# Patient Record
Sex: MALE | Race: WHITE | Employment: OTHER | URBAN - METROPOLITAN AREA
[De-identification: names, ages, dates, MRNs, and addresses within clinical notes are randomized per-mention and may not be internally consistent; named-entity substitution may affect disease eponyms.]

---

## 2017-01-09 ENCOUNTER — TELEPHONE (OUTPATIENT)
Dept: INTERNAL MEDICINE CLINIC | Facility: CLINIC | Age: 62
End: 2017-01-09

## 2017-01-24 ENCOUNTER — OFFICE VISIT (OUTPATIENT)
Dept: INTERNAL MEDICINE CLINIC | Facility: CLINIC | Age: 62
End: 2017-01-24

## 2017-01-24 VITALS
TEMPERATURE: 98 F | WEIGHT: 233.19 LBS | OXYGEN SATURATION: 98 % | BODY MASS INDEX: 30.25 KG/M2 | DIASTOLIC BLOOD PRESSURE: 86 MMHG | RESPIRATION RATE: 16 BRPM | SYSTOLIC BLOOD PRESSURE: 132 MMHG | HEART RATE: 71 BPM | HEIGHT: 73.5 IN

## 2017-01-24 DIAGNOSIS — I10 ESSENTIAL HYPERTENSION: ICD-10-CM

## 2017-01-24 DIAGNOSIS — E11.9 TYPE 2 DIABETES MELLITUS WITHOUT COMPLICATION, WITHOUT LONG-TERM CURRENT USE OF INSULIN (HCC): Primary | ICD-10-CM

## 2017-01-24 DIAGNOSIS — E78.00 PURE HYPERCHOLESTEROLEMIA: ICD-10-CM

## 2017-01-24 DIAGNOSIS — Z23 NEED FOR VACCINATION: ICD-10-CM

## 2017-01-24 PROCEDURE — 90471 IMMUNIZATION ADMIN: CPT | Performed by: INTERNAL MEDICINE

## 2017-01-24 PROCEDURE — 90715 TDAP VACCINE 7 YRS/> IM: CPT | Performed by: INTERNAL MEDICINE

## 2017-01-24 PROCEDURE — 99214 OFFICE O/P EST MOD 30 MIN: CPT | Performed by: INTERNAL MEDICINE

## 2017-01-24 RX ORDER — LISINOPRIL 10 MG/1
10 TABLET ORAL
Qty: 90 TABLET | Refills: 1 | Status: SHIPPED | OUTPATIENT
Start: 2017-01-24 | End: 2017-08-02

## 2017-01-24 RX ORDER — ATORVASTATIN CALCIUM 40 MG/1
TABLET, FILM COATED ORAL
Qty: 45 TABLET | Refills: 1 | Status: SHIPPED | OUTPATIENT
Start: 2017-01-24 | End: 2017-08-02

## 2017-01-24 NOTE — PROGRESS NOTES
HPI:   Rory Escamilla is a 59year old male who presents for recheck of his diabetes. he feels like his weight is the same  Wearing heavier shoes and coat, got weighted at work last week and 128  Just joined the DM lifestyle program at [address] abnormalities      Results for orders placed or performed in visit on 74/58/61  -COMP METABOLIC PANEL (14)   Result Value Ref Range   GLUCOSE 127 (H) 65 - 99 mg/dL   UREA NITROGEN (BUN) 19 7 - 25 mg/dL   CREATININE 0.92 0.70 - 1.25 mg/dL   eGFR NON-AFR.  AM insulin (hcc)  (primary encounter diagnosis)  Pure hypercholesterolemia  Essential hypertension  Need for vaccination      Orders Placed This Encounter  CMP in 3 months  Lipid in 3 months  Hemoglobin A1C in 3 months  TdaP (Boostrix/Adacel) Vaccine (> 7 Y)

## 2017-02-06 RX ORDER — BLOOD SUGAR DIAGNOSTIC
STRIP MISCELLANEOUS
Qty: 200 STRIP | Refills: 0 | Status: SHIPPED | OUTPATIENT
Start: 2017-02-06 | End: 2018-01-29

## 2017-04-03 RX ORDER — METFORMIN HYDROCHLORIDE 500 MG/1
TABLET, EXTENDED RELEASE ORAL
Qty: 90 TABLET | Refills: 0 | Status: SHIPPED | OUTPATIENT
Start: 2017-04-03 | End: 2017-04-28 | Stop reason: DRUGHIGH

## 2017-04-28 ENCOUNTER — OFFICE VISIT (OUTPATIENT)
Dept: INTERNAL MEDICINE CLINIC | Facility: CLINIC | Age: 62
End: 2017-04-28

## 2017-04-28 VITALS
RESPIRATION RATE: 16 BRPM | WEIGHT: 230 LBS | HEIGHT: 73.5 IN | DIASTOLIC BLOOD PRESSURE: 82 MMHG | TEMPERATURE: 98 F | BODY MASS INDEX: 29.83 KG/M2 | SYSTOLIC BLOOD PRESSURE: 122 MMHG | HEART RATE: 76 BPM | OXYGEN SATURATION: 98 %

## 2017-04-28 DIAGNOSIS — Z12.11 COLON CANCER SCREENING: Primary | ICD-10-CM

## 2017-04-28 DIAGNOSIS — IMO0001 UNCONTROLLED TYPE 2 DIABETES MELLITUS WITHOUT COMPLICATION, WITHOUT LONG-TERM CURRENT USE OF INSULIN: ICD-10-CM

## 2017-04-28 PROCEDURE — 99214 OFFICE O/P EST MOD 30 MIN: CPT | Performed by: INTERNAL MEDICINE

## 2017-04-28 RX ORDER — METFORMIN HYDROCHLORIDE 500 MG/1
1000 TABLET, EXTENDED RELEASE ORAL
Qty: 180 TABLET | Refills: 1 | Status: SHIPPED | OUTPATIENT
Start: 2017-04-28 | End: 2018-02-20

## 2017-04-28 NOTE — PROGRESS NOTES
HPI:   Elizabeth Hearn is a 57year old male who presents for recheck of his diabetes.  They are still going to classes at [facility] but not much in the past 2 months  He is running higher    Wt Readings from Last 6 Encounters:  04/28/17 : 230 lb  01/24/17 : appearance is normal.  Bilateral monofilament/sensation of both feet is normal.  Pulsation pedal pulse exam of both lower legs/feet is normal as well.         Results for orders placed or performed in visit on 56/89/22  -COMP METABOLIC PANEL (14)   Result V (primary encounter diagnosis)      Orders Placed This Encounter  Hemoglobin A1C in 3 months  Occult Blood, Fecal, Immunoassay [E]        Meds & Refills for this Visit:  Signed Prescriptions Disp Refills    MetFORMIN HCl  MG Oral Tablet 24 Hr 180 tabl

## 2017-08-02 RX ORDER — ATORVASTATIN CALCIUM 40 MG/1
TABLET, FILM COATED ORAL
Qty: 45 TABLET | Refills: 0 | Status: SHIPPED | OUTPATIENT
Start: 2017-08-02 | End: 2017-10-22

## 2017-08-02 RX ORDER — LISINOPRIL 10 MG/1
TABLET ORAL
Qty: 90 TABLET | Refills: 0 | Status: SHIPPED | OUTPATIENT
Start: 2017-08-02 | End: 2017-10-22

## 2017-09-14 LAB — HEMOGLOBIN A1C: 6.8 % OF TOTAL HGB

## 2017-09-18 ENCOUNTER — OFFICE VISIT (OUTPATIENT)
Dept: INTERNAL MEDICINE CLINIC | Facility: CLINIC | Age: 62
End: 2017-09-18

## 2017-09-18 VITALS
RESPIRATION RATE: 16 BRPM | HEIGHT: 73.5 IN | BODY MASS INDEX: 29.19 KG/M2 | WEIGHT: 225 LBS | SYSTOLIC BLOOD PRESSURE: 122 MMHG | OXYGEN SATURATION: 98 % | TEMPERATURE: 98 F | HEART RATE: 79 BPM | DIASTOLIC BLOOD PRESSURE: 82 MMHG

## 2017-09-18 DIAGNOSIS — E78.00 PURE HYPERCHOLESTEROLEMIA: ICD-10-CM

## 2017-09-18 DIAGNOSIS — Z00.00 ROUTINE GENERAL MEDICAL EXAMINATION AT A HEALTH CARE FACILITY: ICD-10-CM

## 2017-09-18 DIAGNOSIS — I10 ESSENTIAL HYPERTENSION: ICD-10-CM

## 2017-09-18 DIAGNOSIS — IMO0001 UNCONTROLLED TYPE 2 DIABETES MELLITUS WITHOUT COMPLICATION, WITHOUT LONG-TERM CURRENT USE OF INSULIN: Primary | ICD-10-CM

## 2017-09-18 PROCEDURE — 99214 OFFICE O/P EST MOD 30 MIN: CPT | Performed by: INTERNAL MEDICINE

## 2017-09-18 NOTE — PROGRESS NOTES
HPI:   Sergio Weiss is a 57year old male who presents for recheck of his diabetes.    He retired and is more active    Wt Readings from Last 6 Encounters:  09/18/17 : 225 lb  04/28/17 : 230 lb  01/24/17 : 233 lb 3.2 oz  07/07/16 : 225 lb  02/11/16 : 223 placed or performed in visit on 04/28/17  -HEMOGLOBIN A1C   Result Value Ref Range   HEMOGLOBIN A1c 6.8 (H) <5.7 % of total Hgb           ASSESSMENT AND PLAN:   Virginia Robertson is a 57year old male who presents for a recheck of his diabetes.  Diabetic contr

## 2017-10-24 RX ORDER — ATORVASTATIN CALCIUM 40 MG/1
TABLET, FILM COATED ORAL
Qty: 45 TABLET | Refills: 0 | Status: SHIPPED | OUTPATIENT
Start: 2017-10-24 | End: 2017-12-19

## 2017-10-24 RX ORDER — LISINOPRIL 10 MG/1
TABLET ORAL
Qty: 90 TABLET | Refills: 0 | Status: SHIPPED | OUTPATIENT
Start: 2017-10-24 | End: 2017-12-19

## 2017-12-09 ENCOUNTER — TELEPHONE (OUTPATIENT)
Dept: INTERNAL MEDICINE CLINIC | Facility: CLINIC | Age: 62
End: 2017-12-09

## 2017-12-09 NOTE — TELEPHONE ENCOUNTER
Incoming (mail or fax):  fax  Received from:  Kale Mccall  Documentation given to:  [facility]

## 2017-12-19 ENCOUNTER — OFFICE VISIT (OUTPATIENT)
Dept: INTERNAL MEDICINE CLINIC | Facility: CLINIC | Age: 62
End: 2017-12-19

## 2017-12-19 VITALS
BODY MASS INDEX: 30.33 KG/M2 | RESPIRATION RATE: 16 BRPM | TEMPERATURE: 98 F | HEART RATE: 88 BPM | HEIGHT: 72.5 IN | WEIGHT: 226.38 LBS | OXYGEN SATURATION: 98 % | SYSTOLIC BLOOD PRESSURE: 120 MMHG | DIASTOLIC BLOOD PRESSURE: 84 MMHG

## 2017-12-19 DIAGNOSIS — Z00.00 ROUTINE GENERAL MEDICAL EXAMINATION AT A HEALTH CARE FACILITY: Primary | ICD-10-CM

## 2017-12-19 DIAGNOSIS — IMO0001 UNCONTROLLED TYPE 2 DIABETES MELLITUS WITHOUT COMPLICATION, WITHOUT LONG-TERM CURRENT USE OF INSULIN: ICD-10-CM

## 2017-12-19 DIAGNOSIS — E78.00 PURE HYPERCHOLESTEROLEMIA: ICD-10-CM

## 2017-12-19 DIAGNOSIS — I10 ESSENTIAL HYPERTENSION: ICD-10-CM

## 2017-12-19 PROCEDURE — 99396 PREV VISIT EST AGE 40-64: CPT | Performed by: INTERNAL MEDICINE

## 2017-12-19 PROCEDURE — 99213 OFFICE O/P EST LOW 20 MIN: CPT | Performed by: INTERNAL MEDICINE

## 2017-12-19 RX ORDER — LISINOPRIL 10 MG/1
TABLET ORAL
Qty: 90 TABLET | Refills: 1 | Status: SHIPPED | OUTPATIENT
Start: 2017-12-19 | End: 2018-07-22

## 2017-12-19 RX ORDER — ATORVASTATIN CALCIUM 40 MG/1
TABLET, FILM COATED ORAL
Qty: 45 TABLET | Refills: 1 | Status: SHIPPED | OUTPATIENT
Start: 2017-12-19 | End: 2018-07-22

## 2017-12-19 NOTE — PROGRESS NOTES
Francisca Pritchard is a 57year old male who presents for a complete physical exam.   Also for his routine scheduled DM check  HPI:   He was doing their DM class at [facility], the instrucotr moved to [place] and has n't been back.     Wt Readings from Last 6 Encount lesions  EYES:denies blurred vision or double vision  HEENT: denies nasal congestion, sinus pain or ST  LUNGS: denies shortness of breath with exertion or chronic cough  CARDIOVASCULAR: denies chest pain on exertion  GI: denies abdominal pain,denies heartb mL/min/1.73m2 Final   • BUN/CREATININE RATIO 65/49/5692 NOT APPLICABLE  6 - 22 (calc) Final   • SODIUM 12/15/2017 136  135 - 146 mmol/L Final   • POTASSIUM 12/15/2017 4.4  3.5 - 5.3 mmol/L Final   • CHLORIDE 12/15/2017 103  98 - 110 mmol/L Final   • CARBON UTD,, on statin and ACE  Routine general medical examination at a health care facility  (primary encounter diagnosis)  Pure hypercholesterolemia- on statin, CPM  Essential hypertension at goal, CPM      Orders Placed This Encounter      CMP in 6 months

## 2018-01-29 RX ORDER — BLOOD SUGAR DIAGNOSTIC
STRIP MISCELLANEOUS
Qty: 200 STRIP | Refills: 0 | Status: SHIPPED | OUTPATIENT
Start: 2018-01-29 | End: 2018-06-20

## 2018-02-20 DIAGNOSIS — IMO0001 UNCONTROLLED TYPE 2 DIABETES MELLITUS WITHOUT COMPLICATION, WITHOUT LONG-TERM CURRENT USE OF INSULIN: ICD-10-CM

## 2018-02-21 RX ORDER — METFORMIN HYDROCHLORIDE 500 MG/1
TABLET, EXTENDED RELEASE ORAL
Qty: 180 TABLET | Refills: 1 | Status: SHIPPED | OUTPATIENT
Start: 2018-02-21 | End: 2018-12-14

## 2018-06-21 RX ORDER — BLOOD SUGAR DIAGNOSTIC
STRIP MISCELLANEOUS
Qty: 200 STRIP | Refills: 0 | Status: SHIPPED | OUTPATIENT
Start: 2018-06-21

## 2018-07-24 LAB
ALBUMIN/GLOBULIN RATIO: 1.7 (CALC) (ref 1–2.5)
ALBUMIN: 3.9 G/DL (ref 3.6–5.1)
ALKALINE PHOSPHATASE: 78 U/L (ref 40–115)
ALT: 13 U/L (ref 9–46)
AST: 12 U/L (ref 10–35)
BILIRUBIN, TOTAL: 0.5 MG/DL (ref 0.2–1.2)
BUN: 19 MG/DL (ref 7–25)
CALCIUM: 8.7 MG/DL (ref 8.6–10.3)
CARBON DIOXIDE: 26 MMOL/L (ref 20–31)
CHLORIDE: 104 MMOL/L (ref 98–110)
CHOL/HDLC RATIO: 4.2 (CALC)
CHOLESTEROL, TOTAL: 167 MG/DL
CREATININE, RANDOM URINE: 166 MG/DL (ref 20–370)
CREATININE: 0.97 MG/DL (ref 0.7–1.25)
EGFR IF AFRICN AM: 95 ML/MIN/1.73M2
EGFR IF NONAFRICN AM: 82 ML/MIN/1.73M2
GLOBULIN: 2.3 G/DL (CALC) (ref 1.9–3.7)
GLUCOSE: 117 MG/DL (ref 65–99)
HDL CHOLESTEROL: 40 MG/DL
HEMOGLOBIN A1C: 7 % OF TOTAL HGB
LDL-CHOLESTEROL: 108 MG/DL (CALC)
MICROALBUMIN/CREATININE RATIO, RANDOM URINE: 2 MCG/MG CREAT
MICROALBUMIN: 0.3 MG/DL
NON-HDL CHOLESTEROL: 127 MG/DL (CALC)
POTASSIUM: 4.3 MMOL/L (ref 3.5–5.3)
PROTEIN, TOTAL: 6.2 G/DL (ref 6.1–8.1)
SODIUM: 138 MMOL/L (ref 135–146)
TRIGLYCERIDES: 98 MG/DL

## 2018-07-24 RX ORDER — ATORVASTATIN CALCIUM 40 MG/1
TABLET, FILM COATED ORAL
Qty: 45 TABLET | Refills: 0 | Status: SHIPPED | OUTPATIENT
Start: 2018-07-24 | End: 2018-10-29

## 2018-07-24 RX ORDER — LISINOPRIL 10 MG/1
TABLET ORAL
Qty: 90 TABLET | Refills: 0 | Status: SHIPPED | OUTPATIENT
Start: 2018-07-24 | End: 2018-10-29

## 2018-07-27 ENCOUNTER — OFFICE VISIT (OUTPATIENT)
Dept: INTERNAL MEDICINE CLINIC | Facility: CLINIC | Age: 63
End: 2018-07-27
Payer: COMMERCIAL

## 2018-07-27 VITALS
BODY MASS INDEX: 30.15 KG/M2 | DIASTOLIC BLOOD PRESSURE: 72 MMHG | HEART RATE: 78 BPM | TEMPERATURE: 98 F | SYSTOLIC BLOOD PRESSURE: 124 MMHG | WEIGHT: 225 LBS | HEIGHT: 72.5 IN | OXYGEN SATURATION: 96 % | RESPIRATION RATE: 14 BRPM

## 2018-07-27 DIAGNOSIS — IMO0001 UNCONTROLLED TYPE 2 DIABETES MELLITUS WITHOUT COMPLICATION, WITHOUT LONG-TERM CURRENT USE OF INSULIN: Primary | ICD-10-CM

## 2018-07-27 DIAGNOSIS — E78.00 PURE HYPERCHOLESTEROLEMIA: ICD-10-CM

## 2018-07-27 DIAGNOSIS — I10 ESSENTIAL HYPERTENSION: ICD-10-CM

## 2018-07-27 PROCEDURE — 99213 OFFICE O/P EST LOW 20 MIN: CPT | Performed by: INTERNAL MEDICINE

## 2018-07-27 NOTE — PROGRESS NOTES
HPI:   Jimmy Oliva is a 70year old male who presents for recheck of his diabetes. Pt complains of nothing, feels good.     Wt Readings from Last 6 Encounters:  07/27/18 : 225 lb  12/19/17 : 226 lb 6.4 oz  09/18/17 : 225 lb  04/28/17 : 230 lb  01/24/17 : diabetic exam is normal, visualized feet and the appearance is normal.  Bilateral monofilament/sensation of both feet is normal.  Pulsation pedal pulse exam of both lower legs/feet is normal as well.       No visits with results within 1 Month(s) from this MICROALBUMIN/CREATININE RATIO, RAN* 07/23/2018 2  <30 mcg/mg creat Final         ASSESSMENT AND PLAN:   Lindsay Bentley is a 70year old male who presents for a recheck of his diabetes.  Diabetic control is OK, a little higher, CPM . HTN: at goal, Hyperchole

## 2018-10-29 DIAGNOSIS — IMO0001 UNCONTROLLED TYPE 2 DIABETES MELLITUS WITHOUT COMPLICATION, WITHOUT LONG-TERM CURRENT USE OF INSULIN: ICD-10-CM

## 2018-10-29 NOTE — TELEPHONE ENCOUNTER
Patient following up regarding refill request stated is he completley out of medication. Preferred Pharmacy: [facility] [address] [place] [address], [facility] Fabio Klein AT THE [facility] OF [address] [facility], [phone], [phone]. Please advise. Thank you!

## 2018-10-30 RX ORDER — METFORMIN HYDROCHLORIDE 500 MG/1
TABLET, EXTENDED RELEASE ORAL
Qty: 180 TABLET | Refills: 0 | Status: SHIPPED | OUTPATIENT
Start: 2018-10-30 | End: 2019-02-26 | Stop reason: ALTCHOICE

## 2018-10-30 RX ORDER — ATORVASTATIN CALCIUM 40 MG/1
TABLET, FILM COATED ORAL
Qty: 45 TABLET | Refills: 0 | Status: SHIPPED | OUTPATIENT
Start: 2018-10-30 | End: 2019-01-25

## 2018-10-30 RX ORDER — LISINOPRIL 10 MG/1
TABLET ORAL
Qty: 90 TABLET | Refills: 0 | Status: SHIPPED | OUTPATIENT
Start: 2018-10-30 | End: 2019-01-25

## 2018-10-30 NOTE — TELEPHONE ENCOUNTER
Patient called in inquiring on the status of his prescriptions, as he is completely out of his medication. Patient would like a call once the medications have been sent over to the [facility]. Please call patient at [phone]. Thank you!

## 2018-12-10 ENCOUNTER — TELEPHONE (OUTPATIENT)
Dept: INTERNAL MEDICINE CLINIC | Facility: CLINIC | Age: 63
End: 2018-12-10

## 2018-12-10 NOTE — TELEPHONE ENCOUNTER
Patient requesting nurses to fax lab instructions to [facility] #[phone]. Patient stated they have an appointment at [facility] morning. Please advise. Thank you!

## 2018-12-14 ENCOUNTER — OFFICE VISIT (OUTPATIENT)
Dept: INTERNAL MEDICINE CLINIC | Facility: CLINIC | Age: 63
End: 2018-12-14
Payer: COMMERCIAL

## 2018-12-14 VITALS
SYSTOLIC BLOOD PRESSURE: 124 MMHG | TEMPERATURE: 98 F | RESPIRATION RATE: 16 BRPM | DIASTOLIC BLOOD PRESSURE: 82 MMHG | WEIGHT: 225 LBS | BODY MASS INDEX: 30.15 KG/M2 | HEIGHT: 72.5 IN | OXYGEN SATURATION: 98 % | HEART RATE: 68 BPM

## 2018-12-14 DIAGNOSIS — E78.00 PURE HYPERCHOLESTEROLEMIA: ICD-10-CM

## 2018-12-14 DIAGNOSIS — IMO0001 UNCONTROLLED TYPE 2 DIABETES MELLITUS WITHOUT COMPLICATION, WITHOUT LONG-TERM CURRENT USE OF INSULIN: ICD-10-CM

## 2018-12-14 DIAGNOSIS — I10 ESSENTIAL HYPERTENSION: Primary | ICD-10-CM

## 2018-12-14 PROCEDURE — 99213 OFFICE O/P EST LOW 20 MIN: CPT | Performed by: INTERNAL MEDICINE

## 2018-12-14 RX ORDER — ATORVASTATIN CALCIUM 40 MG/1
TABLET, FILM COATED ORAL
Qty: 45 TABLET | Refills: 1 | Status: CANCELLED | OUTPATIENT
Start: 2018-12-14

## 2018-12-14 NOTE — PROGRESS NOTES
HPI:   Ivor Schilder is a 70year old male who presents for recheck of his diabetes. Pt complains of nothing  .     Wt Readings from Last 6 Encounters:  12/14/18 : 225 lb  07/27/18 : 225 lb  12/19/17 : 226 lb 6.4 oz  09/18/17 : 225 lb  04/28/17 : 230 lb  0 1.25 mg/dL    eGFR NON-AFR.  AMERICAN 74 > OR = 60 mL/min/1.73m2    eGFR AFRICAN AMERICAN 86 > OR = 60 mL/min/1.73m2    BUN/CREATININE RATIO NOT APPLICABLE 6 - 22 (calc)    SODIUM 138 135 - 146 mmol/L    POTASSIUM 4.2 3.5 - 5.3 mmol/L    CHLORIDE 103 98 - 1 Microalb/Creat Ratio, in 6 months      PSA (Screening) [E]          Meds & Refills for this Visit:  Requested Prescriptions      No prescriptions requested or ordered in this encounter       Imaging & Consults:  None    There are no Patient Instructions on

## 2019-01-28 RX ORDER — LISINOPRIL 10 MG/1
TABLET ORAL
Qty: 90 TABLET | Refills: 1 | Status: SHIPPED | OUTPATIENT
Start: 2019-01-28 | End: 2019-07-23

## 2019-01-28 RX ORDER — ATORVASTATIN CALCIUM 40 MG/1
TABLET, FILM COATED ORAL
Qty: 45 TABLET | Refills: 1 | Status: SHIPPED | OUTPATIENT
Start: 2019-01-28 | End: 2019-07-23

## 2019-01-29 ENCOUNTER — OFFICE VISIT (OUTPATIENT)
Dept: INTERNAL MEDICINE CLINIC | Facility: CLINIC | Age: 64
End: 2019-01-29
Payer: COMMERCIAL

## 2019-01-29 VITALS
OXYGEN SATURATION: 99 % | WEIGHT: 222.31 LBS | HEART RATE: 92 BPM | HEIGHT: 72.84 IN | RESPIRATION RATE: 16 BRPM | DIASTOLIC BLOOD PRESSURE: 84 MMHG | SYSTOLIC BLOOD PRESSURE: 128 MMHG | BODY MASS INDEX: 29.46 KG/M2

## 2019-01-29 DIAGNOSIS — IMO0001 UNCONTROLLED TYPE 2 DIABETES MELLITUS WITHOUT COMPLICATION, WITHOUT LONG-TERM CURRENT USE OF INSULIN: ICD-10-CM

## 2019-01-29 DIAGNOSIS — E78.00 PURE HYPERCHOLESTEROLEMIA: ICD-10-CM

## 2019-01-29 DIAGNOSIS — Z23 NEED FOR VACCINATION: ICD-10-CM

## 2019-01-29 DIAGNOSIS — Z00.00 ENCOUNTER FOR ANNUAL HEALTH EXAMINATION: Primary | ICD-10-CM

## 2019-01-29 DIAGNOSIS — Z12.11 SCREENING FOR COLON CANCER: ICD-10-CM

## 2019-01-29 DIAGNOSIS — I10 ESSENTIAL HYPERTENSION: ICD-10-CM

## 2019-01-29 PROCEDURE — 99397 PER PM REEVAL EST PAT 65+ YR: CPT | Performed by: INTERNAL MEDICINE

## 2019-01-29 PROCEDURE — G0009 ADMIN PNEUMOCOCCAL VACCINE: HCPCS | Performed by: INTERNAL MEDICINE

## 2019-01-29 PROCEDURE — G0402 INITIAL PREVENTIVE EXAM: HCPCS | Performed by: INTERNAL MEDICINE

## 2019-01-29 PROCEDURE — 90670 PCV13 VACCINE IM: CPT | Performed by: INTERNAL MEDICINE

## 2019-01-29 PROCEDURE — 96160 PT-FOCUSED HLTH RISK ASSMT: CPT | Performed by: INTERNAL MEDICINE

## 2019-01-29 NOTE — PROGRESS NOTES
HPI:   Mallory aHrrison is a 70year old male who presents for a MA (Medicare Advantage) Supervisit (Once per calendar year).       His last annual assessment has been over 1 year: Annual Physical due on 12/19/2018         Fall/Risk Assessment   He has been Lab Results   Component Value Date    CHOLEST 166 12/11/2018    HDL 40 (L) 12/11/2018     (H) 12/11/2018    TRIG 110 12/11/2018          Last Chemistry Labs:   Lab Results   Component Value Date    AST 13 12/11/2018    ALT 14 12/11/2018    CA 9.2 otherwise  SKIN: denies any unusual skin lesions  EYES: denies blurred vision or double vision  HEENT: denies nasal congestion, sinus pain or ST  LUNGS: denies shortness of breath with exertion  CARDIOVASCULAR: denies chest pain on exertion  GI: denies abd normal prostate, no masses or tenderness   Extremities: Extremities normal, atraumatic, no cyanosis or edema   Pulses: 2+ and symmetric   Skin: Skin color, texture, turgor normal, no rashes or lesions   Lymph nodes: Cervical, supraclavicular, and axillary separate sheet to patient  PREVENTATIVE SERVICES  INDICATIONS AND SCHEDULE Internal Lab or Procedure External Lab or Procedure   Diabetes Screening      HbgA1C   Annually HEMOGLOBIN A1c (% of total Hgb)   Date Value   12/11/2018 7.1 (H)       No flowsheet metal- TD and TDaP Not covered by Medicare Part B) No vaccine history found This may be covered with your prescription benefits, but Medicare does not cover unless Medically needed    Zoster   Not covered by Medicare Part B No vaccine history found This ma

## 2019-01-29 NOTE — PATIENT INSTRUCTIONS
Jacek Marquez's SCREENING SCHEDULE   Tests on this list are recommended by your physician but may not be covered, or covered at this frequency, by your insurer. Please check with your insurance carrier before scheduling to verify coverage.     PREVENTATI Screen   Covered every 10 years- more often if abnormal Colonoscopy due on 04/16/1955 Update [facility] if applicable    Flex Sigmoidoscopy Screen  Covered every 5 years No results found for this or any previous visit. No flowsheet data found. DIPHTHERIA TOXOIDS AND ACELLULAR PERTUSIS VACCINE (TDAP), >7 YEARS, IM USE    This may be covered with your prescription benefits, but Medicare does not cover unless Medically needed    Zoster (Not covered by Medicare Part B) No orders found for this or an

## 2019-02-25 DIAGNOSIS — IMO0001 UNCONTROLLED TYPE 2 DIABETES MELLITUS WITHOUT COMPLICATION, WITHOUT LONG-TERM CURRENT USE OF INSULIN: ICD-10-CM

## 2019-02-26 RX ORDER — METFORMIN HYDROCHLORIDE 500 MG/1
TABLET, EXTENDED RELEASE ORAL
Qty: 180 TABLET | Refills: 1 | Status: SHIPPED | OUTPATIENT
Start: 2019-02-26 | End: 2020-01-13

## 2019-07-06 LAB
ALBUMIN/GLOBULIN RATIO: 1.9 (CALC) (ref 1–2.5)
ALBUMIN: 4.1 G/DL (ref 3.6–5.1)
ALKALINE PHOSPHATASE: 70 U/L (ref 40–115)
ALT: 12 U/L (ref 9–46)
AST: 13 U/L (ref 10–35)
BILIRUBIN, TOTAL: 0.6 MG/DL (ref 0.2–1.2)
BUN: 23 MG/DL (ref 7–25)
CALCIUM: 8.8 MG/DL (ref 8.6–10.3)
CARBON DIOXIDE: 25 MMOL/L (ref 20–32)
CHLORIDE: 105 MMOL/L (ref 98–110)
CHOL/HDLC RATIO: 4.1 (CALC)
CHOLESTEROL, TOTAL: 164 MG/DL
CREATININE, RANDOM URINE: 160 MG/DL (ref 20–320)
CREATININE: 0.92 MG/DL (ref 0.7–1.25)
EGFR IF AFRICN AM: 100 ML/MIN/1.73M2
EGFR IF NONAFRICN AM: 86 ML/MIN/1.73M2
GLOBULIN: 2.2 G/DL (CALC) (ref 1.9–3.7)
GLUCOSE: 125 MG/DL (ref 65–99)
HDL CHOLESTEROL: 40 MG/DL
HEMOGLOBIN A1C: 6.9 % OF TOTAL HGB
LDL-CHOLESTEROL: 107 MG/DL (CALC)
MICROALBUMIN/CREATININE RATIO, RANDOM URINE: 3 MCG/MG CREAT
MICROALBUMIN: 0.4 MG/DL
NON-HDL CHOLESTEROL: 124 MG/DL (CALC)
POTASSIUM: 4.4 MMOL/L (ref 3.5–5.3)
PROTEIN, TOTAL: 6.3 G/DL (ref 6.1–8.1)
SODIUM: 138 MMOL/L (ref 135–146)
TRIGLYCERIDES: 77 MG/DL

## 2019-07-09 ENCOUNTER — OFFICE VISIT (OUTPATIENT)
Dept: INTERNAL MEDICINE CLINIC | Facility: CLINIC | Age: 64
End: 2019-07-09

## 2019-07-09 VITALS
HEART RATE: 73 BPM | WEIGHT: 221 LBS | DIASTOLIC BLOOD PRESSURE: 80 MMHG | SYSTOLIC BLOOD PRESSURE: 124 MMHG | BODY MASS INDEX: 29.29 KG/M2 | RESPIRATION RATE: 16 BRPM | HEIGHT: 72.84 IN | OXYGEN SATURATION: 99 %

## 2019-07-09 DIAGNOSIS — E78.00 PURE HYPERCHOLESTEROLEMIA: ICD-10-CM

## 2019-07-09 DIAGNOSIS — M77.11 LATERAL EPICONDYLITIS OF RIGHT ELBOW: ICD-10-CM

## 2019-07-09 DIAGNOSIS — I10 ESSENTIAL HYPERTENSION: ICD-10-CM

## 2019-07-09 DIAGNOSIS — E11.9 TYPE 2 DIABETES MELLITUS WITHOUT COMPLICATION, WITHOUT LONG-TERM CURRENT USE OF INSULIN (HCC): Primary | ICD-10-CM

## 2019-07-09 PROBLEM — IMO0001 UNCONTROLLED TYPE 2 DIABETES MELLITUS WITHOUT COMPLICATION, WITHOUT LONG-TERM CURRENT USE OF INSULIN: Status: RESOLVED | Noted: 2017-04-28 | Resolved: 2019-07-09

## 2019-07-09 PROCEDURE — 99214 OFFICE O/P EST MOD 30 MIN: CPT | Performed by: INTERNAL MEDICINE

## 2019-07-09 NOTE — PATIENT INSTRUCTIONS
Tennis elbow: tennis elbow brace wear during the day, use ice to area 4 times daily, use ibuprofen 600 mg ( 3tablets) 3 times daily w/food

## 2019-07-09 NOTE — PROGRESS NOTES
HPI:   Wong Saldaña is a 75year old male who presents for recheck of his diabetes. Pt complains of right elbow pain off and on for a few months, lots of lifting and gripping  No CP w/all of his activity.     Wt Readings from Last 6 Encounters:  07/09/19 normal.  Pulsation pedal pulse exam of both lower legs/feet is normal as well.     Right elbow tender lateral epicondyle      Results for orders placed or performed in visit on 07/05/19   LIPID PANEL   Result Value Ref Range    CHOLESTEROL, TOTAL 164 <200 m to return in 6 mos.     Type 2 diabetes mellitus without complication, without long-term current use of insulin ([facility])  (primary encounter diagnosis)  Essential hypertension  Pure hypercholesterolemia  Lateral epicondylitis of right elbow    Orders Placed Th

## 2019-07-24 RX ORDER — LISINOPRIL 10 MG/1
TABLET ORAL
Qty: 90 TABLET | Refills: 1 | Status: SHIPPED | OUTPATIENT
Start: 2019-07-24 | End: 2020-01-22

## 2019-07-24 RX ORDER — ATORVASTATIN CALCIUM 40 MG/1
TABLET, FILM COATED ORAL
Qty: 45 TABLET | Refills: 1 | Status: SHIPPED | OUTPATIENT
Start: 2019-07-24 | End: 2020-01-22

## 2019-08-26 ENCOUNTER — TELEPHONE (OUTPATIENT)
Dept: INTERNAL MEDICINE CLINIC | Facility: CLINIC | Age: 64
End: 2019-08-26

## 2019-08-26 NOTE — TELEPHONE ENCOUNTER
Incoming (mail or fax):  fax  Received from:  Gabriele Roe  Documentation given to:  [facility]

## 2019-08-27 ENCOUNTER — MED REC SCAN ONLY (OUTPATIENT)
Dept: INTERNAL MEDICINE CLINIC | Facility: CLINIC | Age: 64
End: 2019-08-27

## 2019-09-03 ENCOUNTER — TELEPHONE (OUTPATIENT)
Dept: INTERNAL MEDICINE CLINIC | Facility: CLINIC | Age: 64
End: 2019-09-03

## 2019-09-17 ENCOUNTER — MED REC SCAN ONLY (OUTPATIENT)
Dept: INTERNAL MEDICINE CLINIC | Facility: CLINIC | Age: 64
End: 2019-09-17

## 2019-10-21 DIAGNOSIS — IMO0001 UNCONTROLLED TYPE 2 DIABETES MELLITUS WITHOUT COMPLICATION, WITHOUT LONG-TERM CURRENT USE OF INSULIN: ICD-10-CM

## 2019-10-22 RX ORDER — METFORMIN HYDROCHLORIDE 500 MG/1
TABLET, EXTENDED RELEASE ORAL
Qty: 180 TABLET | Refills: 0 | Status: SHIPPED | OUTPATIENT
Start: 2019-10-22 | End: 2020-01-22

## 2020-01-10 LAB
ALBUMIN/GLOBULIN RATIO: 1.8 (CALC) (ref 1–2.5)
ALBUMIN: 4.1 G/DL (ref 3.6–5.1)
ALKALINE PHOSPHATASE: 79 U/L (ref 40–115)
ALT: 13 U/L (ref 9–46)
AST: 12 U/L (ref 10–35)
BILIRUBIN, TOTAL: 0.6 MG/DL (ref 0.2–1.2)
BUN/CREATININE RATIO: 25 (CALC) (ref 6–22)
BUN: 26 MG/DL (ref 7–25)
CALCIUM: 9 MG/DL (ref 8.6–10.3)
CARBON DIOXIDE: 26 MMOL/L (ref 20–32)
CHLORIDE: 104 MMOL/L (ref 98–110)
CHOL/HDLC RATIO: 4.3 (CALC)
CHOLESTEROL, TOTAL: 177 MG/DL
CREATININE: 1.05 MG/DL (ref 0.7–1.25)
EGFR IF AFRICN AM: 85 ML/MIN/1.73M2
EGFR IF NONAFRICN AM: 74 ML/MIN/1.73M2
GLOBULIN: 2.3 G/DL (CALC) (ref 1.9–3.7)
GLUCOSE: 139 MG/DL (ref 65–99)
HDL CHOLESTEROL: 41 MG/DL
HEMOGLOBIN A1C: 7.2 % OF TOTAL HGB
LDL-CHOLESTEROL: 113 MG/DL (CALC)
NON-HDL CHOLESTEROL: 136 MG/DL (CALC)
POTASSIUM: 4.2 MMOL/L (ref 3.5–5.3)
PROTEIN, TOTAL: 6.4 G/DL (ref 6.1–8.1)
SODIUM: 139 MMOL/L (ref 135–146)
TRIGLYCERIDES: 118 MG/DL

## 2020-01-13 ENCOUNTER — OFFICE VISIT (OUTPATIENT)
Dept: INTERNAL MEDICINE CLINIC | Facility: CLINIC | Age: 65
End: 2020-01-13
Payer: COMMERCIAL

## 2020-01-13 VITALS
DIASTOLIC BLOOD PRESSURE: 80 MMHG | SYSTOLIC BLOOD PRESSURE: 120 MMHG | HEIGHT: 73.03 IN | TEMPERATURE: 98 F | RESPIRATION RATE: 14 BRPM | HEART RATE: 72 BPM | OXYGEN SATURATION: 98 % | BODY MASS INDEX: 28.89 KG/M2 | WEIGHT: 218 LBS

## 2020-01-13 DIAGNOSIS — E11.9 TYPE 2 DIABETES MELLITUS WITHOUT COMPLICATION, WITHOUT LONG-TERM CURRENT USE OF INSULIN (HCC): ICD-10-CM

## 2020-01-13 DIAGNOSIS — E78.00 PURE HYPERCHOLESTEROLEMIA: ICD-10-CM

## 2020-01-13 DIAGNOSIS — Z12.5 ENCOUNTER FOR PROSTATE CANCER SCREENING: ICD-10-CM

## 2020-01-13 DIAGNOSIS — Z00.00 ENCOUNTER FOR ANNUAL HEALTH EXAMINATION: Primary | ICD-10-CM

## 2020-01-13 DIAGNOSIS — E11.65 UNCONTROLLED TYPE 2 DIABETES MELLITUS WITH HYPERGLYCEMIA (HCC): ICD-10-CM

## 2020-01-13 DIAGNOSIS — I10 ESSENTIAL HYPERTENSION: ICD-10-CM

## 2020-01-13 DIAGNOSIS — Z12.11 SCREENING FOR COLON CANCER: ICD-10-CM

## 2020-01-13 PROCEDURE — 96160 PT-FOCUSED HLTH RISK ASSMT: CPT | Performed by: INTERNAL MEDICINE

## 2020-01-13 PROCEDURE — 99397 PER PM REEVAL EST PAT 65+ YR: CPT | Performed by: INTERNAL MEDICINE

## 2020-01-13 PROCEDURE — G0438 PPPS, INITIAL VISIT: HCPCS | Performed by: INTERNAL MEDICINE

## 2020-01-13 PROCEDURE — G0009 ADMIN PNEUMOCOCCAL VACCINE: HCPCS | Performed by: INTERNAL MEDICINE

## 2020-01-13 PROCEDURE — 90732 PPSV23 VACC 2 YRS+ SUBQ/IM: CPT | Performed by: INTERNAL MEDICINE

## 2020-01-13 NOTE — PATIENT INSTRUCTIONS
Nurys Marquez's SCREENING SCHEDULE   Tests on this list are recommended by your physician but may not be covered, or covered at this frequency, by your insurer. Please check with your insurance carrier before scheduling to verify coverage.     PREVENTATI Screen   Covered every 10 years- more often if abnormal Colonoscopy due on 04/16/2003 Update [facility] if applicable    Flex Sigmoidoscopy Screen  Covered every 5 years No results found for this or any previous visit. No flowsheet data found. visit on 01/24/17   • TETANUS, DIPHTHERIA TOXOIDS AND ACELLULAR PERTUSIS VACCINE (TDAP), >7 YEARS, IM USE    This may be covered with your prescription benefits, but Medicare does not cover unless Medically needed    Zoster (Not covered by Medicare Part B)

## 2020-01-13 NOTE — PROGRESS NOTES
HPI:   Stevenson Treadwell is a 75year old male who presents for a MA (Medicare Advantage) Supervisit (Once per calendar year).       Retired IT    His wife has been sick w/alveolar hypoventilation and Pulm Htn  He volunteers for [place] complication, without long-term current use of insulin ([address]    Wt Readings from Last 3 Encounters:  01/13/20 : 218 lb (98.9 kg)  07/09/19 : 221 lb (100.2 kg)  01/29/19 : 222 lb 4.8 oz (100.8 kg)     Last Cholesterol Labs:   Lab Results   Component Value D reports that he does not use drugs.      REVIEW OF SYSTEMS:   GENERAL: feels well otherwise  SKIN: denies any unusual skin lesions  EYES: denies blurred vision or double vision  HEENT: denies nasal congestion, sinus pain or ST  LUNGS: denies shortness of br Rectal: Normal tone, normal prostate, no masses or tenderness   Extremities: Bilateral barefoot skin diabetic exam is normal, visualized feet and the appearance is normal.except for some callousing  Bilateral monofilament/sensation of both feet is no describe your current health state?: Good  How do you maintain positive mental well-being?: Social Interaction; Visiting Friends    This section provided for quick review of chart, separate sheet to patient  PREVENTATIVE SERVICES  INDICATIONS AND SCHEDULE I mentally retarded   Persons who live in the same house as a HepB virus carrier   Homosexual men   Illicit injectable drug abusers     Tetanus Toxoid  Only covered with a cut with metal- TD and TDaP Not covered by Medicare Part B) No vaccine history found T

## 2020-01-21 DIAGNOSIS — E11.9 TYPE 2 DIABETES MELLITUS WITHOUT COMPLICATION, WITHOUT LONG-TERM CURRENT USE OF INSULIN (HCC): Primary | ICD-10-CM

## 2020-01-21 DIAGNOSIS — I10 ESSENTIAL HYPERTENSION: ICD-10-CM

## 2020-01-21 DIAGNOSIS — E78.00 PURE HYPERCHOLESTEROLEMIA: ICD-10-CM

## 2020-01-22 RX ORDER — METFORMIN HYDROCHLORIDE 500 MG/1
TABLET, EXTENDED RELEASE ORAL
Qty: 180 TABLET | Refills: 1 | Status: SHIPPED | OUTPATIENT
Start: 2020-01-22 | End: 2020-08-06

## 2020-01-22 RX ORDER — LISINOPRIL 10 MG/1
TABLET ORAL
Qty: 90 TABLET | Refills: 1 | Status: SHIPPED | OUTPATIENT
Start: 2020-01-22 | End: 2020-07-14

## 2020-01-22 RX ORDER — ATORVASTATIN CALCIUM 40 MG/1
TABLET, FILM COATED ORAL
Qty: 45 TABLET | Refills: 1 | Status: SHIPPED | OUTPATIENT
Start: 2020-01-22 | End: 2020-07-14

## 2020-04-28 ENCOUNTER — TELEPHONE (OUTPATIENT)
Dept: INTERNAL MEDICINE CLINIC | Facility: CLINIC | Age: 65
End: 2020-04-28

## 2020-05-01 ENCOUNTER — TELEMEDICINE (OUTPATIENT)
Dept: INTERNAL MEDICINE CLINIC | Facility: CLINIC | Age: 65
End: 2020-05-01

## 2020-05-01 ENCOUNTER — TELEPHONE (OUTPATIENT)
Dept: INTERNAL MEDICINE CLINIC | Facility: CLINIC | Age: 65
End: 2020-05-01

## 2020-05-01 DIAGNOSIS — R05.9 COUGH: ICD-10-CM

## 2020-05-01 DIAGNOSIS — E78.00 PURE HYPERCHOLESTEROLEMIA: ICD-10-CM

## 2020-05-01 DIAGNOSIS — I10 ESSENTIAL HYPERTENSION: ICD-10-CM

## 2020-05-01 DIAGNOSIS — R50.81 FEVER IN OTHER DISEASES: Primary | ICD-10-CM

## 2020-05-01 DIAGNOSIS — E11.9 TYPE 2 DIABETES MELLITUS WITHOUT COMPLICATION, WITHOUT LONG-TERM CURRENT USE OF INSULIN (HCC): ICD-10-CM

## 2020-05-01 PROCEDURE — 99214 OFFICE O/P EST MOD 30 MIN: CPT | Performed by: INTERNAL MEDICINE

## 2020-05-01 NOTE — TELEPHONE ENCOUNTER
Virtual/Telephone Consent    Prabhjot Hollis verbally {consents to a Virtual/Telephone Check-In service on 5/1/2020. Patient understands and accepts financial responsibility for any deductible, co-insurance and/or co-pays associated with this service.     P

## 2020-05-01 NOTE — PROGRESS NOTES
'    ''  '\]\Virtual Telephone Check-In    Ivor Schilder verbally consents to a Virtual/Telephone Check-In visit on 05/01/20.   Patient understands and accepts financial responsibility for any deductible, co-insurance and/or co-pays associated with this se Patient Active Problem List:     HTN (hypertension)     Hyperlipidemia     Type 2 diabetes mellitus without complication, without long-term current use of insulin (HCC)     Social History: Social History    Tobacco Use      Smoking status: Never Smoker

## 2020-05-04 ENCOUNTER — LAB ENCOUNTER (OUTPATIENT)
Dept: LAB | Facility: HOSPITAL | Age: 65
End: 2020-05-04
Attending: INTERNAL MEDICINE
Payer: MEDICARE

## 2020-05-04 DIAGNOSIS — R05.9 COUGH: ICD-10-CM

## 2020-05-04 DIAGNOSIS — R50.81 FEVER IN OTHER DISEASES: ICD-10-CM

## 2020-07-13 ENCOUNTER — TELEPHONE (OUTPATIENT)
Dept: INTERNAL MEDICINE CLINIC | Facility: CLINIC | Age: 65
End: 2020-07-13

## 2020-07-13 DIAGNOSIS — E78.00 PURE HYPERCHOLESTEROLEMIA: ICD-10-CM

## 2020-07-13 DIAGNOSIS — I10 ESSENTIAL HYPERTENSION: ICD-10-CM

## 2020-07-14 RX ORDER — LISINOPRIL 10 MG/1
TABLET ORAL
Qty: 90 TABLET | Refills: 0 | Status: SHIPPED | OUTPATIENT
Start: 2020-07-14 | End: 2020-10-12

## 2020-07-14 RX ORDER — ATORVASTATIN CALCIUM 40 MG/1
TABLET, FILM COATED ORAL
Qty: 45 TABLET | Refills: 0 | Status: SHIPPED | OUTPATIENT
Start: 2020-07-14 | End: 2020-10-12

## 2020-07-14 NOTE — TELEPHONE ENCOUNTER
Patient scheduled a DM check for August 6th at 10:40 with Dr Wilkes Friday.   PSR mailed lab orders to his home address

## 2020-07-24 ENCOUNTER — OFFICE VISIT (OUTPATIENT)
Dept: FAMILY MEDICINE CLINIC | Facility: CLINIC | Age: 65
End: 2020-07-24

## 2020-07-24 DIAGNOSIS — I10 ESSENTIAL HYPERTENSION: ICD-10-CM

## 2020-07-24 DIAGNOSIS — E11.9 TYPE 2 DIABETES MELLITUS WITHOUT COMPLICATION, WITHOUT LONG-TERM CURRENT USE OF INSULIN (HCC): Primary | ICD-10-CM

## 2020-07-24 DIAGNOSIS — E78.00 PURE HYPERCHOLESTEROLEMIA: ICD-10-CM

## 2020-07-31 LAB
HEMOGLOBIN A1C: 7.6 % OF TOTAL HGB
TOTAL PSA: 3.5 NG/ML

## 2020-08-06 ENCOUNTER — OFFICE VISIT (OUTPATIENT)
Dept: INTERNAL MEDICINE CLINIC | Facility: CLINIC | Age: 65
End: 2020-08-06
Payer: COMMERCIAL

## 2020-08-06 VITALS
HEART RATE: 76 BPM | SYSTOLIC BLOOD PRESSURE: 124 MMHG | OXYGEN SATURATION: 98 % | RESPIRATION RATE: 16 BRPM | HEIGHT: 73.03 IN | BODY MASS INDEX: 30.52 KG/M2 | WEIGHT: 230.31 LBS | TEMPERATURE: 98 F | DIASTOLIC BLOOD PRESSURE: 84 MMHG

## 2020-08-06 DIAGNOSIS — E11.9 TYPE 2 DIABETES MELLITUS WITHOUT COMPLICATION, WITHOUT LONG-TERM CURRENT USE OF INSULIN (HCC): Primary | ICD-10-CM

## 2020-08-06 DIAGNOSIS — I10 ESSENTIAL HYPERTENSION: ICD-10-CM

## 2020-08-06 DIAGNOSIS — E78.00 PURE HYPERCHOLESTEROLEMIA: ICD-10-CM

## 2020-08-06 DIAGNOSIS — E11.65 UNCONTROLLED TYPE 2 DIABETES MELLITUS WITH HYPERGLYCEMIA (HCC): ICD-10-CM

## 2020-08-06 PROCEDURE — 3008F BODY MASS INDEX DOCD: CPT | Performed by: INTERNAL MEDICINE

## 2020-08-06 PROCEDURE — 99214 OFFICE O/P EST MOD 30 MIN: CPT | Performed by: INTERNAL MEDICINE

## 2020-08-06 PROCEDURE — 3074F SYST BP LT 130 MM HG: CPT | Performed by: INTERNAL MEDICINE

## 2020-08-06 PROCEDURE — 3079F DIAST BP 80-89 MM HG: CPT | Performed by: INTERNAL MEDICINE

## 2020-08-06 RX ORDER — METFORMIN HYDROCHLORIDE 500 MG/1
1000 TABLET, EXTENDED RELEASE ORAL
Refills: 0 | COMMUNITY
Start: 2020-08-06 | End: 2020-09-21

## 2020-08-06 NOTE — PROGRESS NOTES
HPI:   Akshat Lieberman is a 77year old male who presents for recheck of his diabetes. Pt complains of nothing except less exercise  Denies PU/PD, visual change, paresthesias, sores on the foot  HTN:Denies CP, dyspnea, orthopnea, edema or palpitations.  Chad  kg)   SpO2 98%   BMI 30.36 kg/m²   GENERAL: well developed, well nourished,in no apparent distress      Lab Results   Component Value Date    A1C 7.6 (H) 07/30/2020         ASSESSMENT AND PLAN:   Romeo Hinson is a 77year old male who presents for a rech

## 2020-09-21 DIAGNOSIS — E11.9 TYPE 2 DIABETES MELLITUS WITHOUT COMPLICATION, WITHOUT LONG-TERM CURRENT USE OF INSULIN (HCC): Primary | ICD-10-CM

## 2020-09-21 RX ORDER — METFORMIN HYDROCHLORIDE 500 MG/1
1000 TABLET, EXTENDED RELEASE ORAL
Qty: 180 TABLET | Refills: 1 | Status: SHIPPED | OUTPATIENT
Start: 2020-09-21 | End: 2021-03-26

## 2020-09-21 NOTE — TELEPHONE ENCOUNTER
Last OV relevant to medication: 8/6/2020  Last refill date: 1/22/2020     #/refills: 180/1  When pt was asked to return for OV: 5 months  Upcoming appt/reason: 1/7/2021, ANA PAULA    Lab Results   Component Value Date     (H) 01/09/2020    BUN 26 (H) 01/09

## 2020-09-21 NOTE — TELEPHONE ENCOUNTER
Did the patient contact the pharmacy directly?:  Pharmacy requesting refill     Is patient out of meds or supply very low?:  Out     Medication Requested:  metFORMIN HCl  MG Oral Tablet 24 Hr    Dose:      Is patient requesting a 30 or 90 day supply?

## 2020-10-12 DIAGNOSIS — I10 ESSENTIAL HYPERTENSION: ICD-10-CM

## 2020-10-12 DIAGNOSIS — E78.00 PURE HYPERCHOLESTEROLEMIA: ICD-10-CM

## 2020-10-12 RX ORDER — ATORVASTATIN CALCIUM 40 MG/1
TABLET, FILM COATED ORAL
Qty: 45 TABLET | Refills: 0 | Status: SHIPPED | OUTPATIENT
Start: 2020-10-12 | End: 2021-01-12

## 2020-10-12 RX ORDER — LISINOPRIL 10 MG/1
TABLET ORAL
Qty: 90 TABLET | Refills: 0 | Status: SHIPPED | OUTPATIENT
Start: 2020-10-12 | End: 2021-01-12

## 2021-01-12 DIAGNOSIS — E78.00 PURE HYPERCHOLESTEROLEMIA: ICD-10-CM

## 2021-01-12 DIAGNOSIS — I10 ESSENTIAL HYPERTENSION: ICD-10-CM

## 2021-01-12 RX ORDER — LISINOPRIL 10 MG/1
TABLET ORAL
Qty: 90 TABLET | Refills: 0 | Status: SHIPPED | OUTPATIENT
Start: 2021-01-12 | End: 2021-04-07

## 2021-01-12 RX ORDER — ATORVASTATIN CALCIUM 40 MG/1
TABLET, FILM COATED ORAL
Qty: 45 TABLET | Refills: 0 | Status: SHIPPED | OUTPATIENT
Start: 2021-01-12 | End: 2021-04-07

## 2021-01-12 NOTE — TELEPHONE ENCOUNTER
Last OV relevant to medication: 8/6/2020  Last refill date: 10/12/2020     #/refills: 45/0, 90/0  When pt was asked to return for OV: 5 months - Supervisit  Upcoming appt/reason: 1/29/2021 - Supervisit    Lab Results   Component Value Date     (H) 0

## 2021-01-26 PROCEDURE — 3051F HG A1C>EQUAL 7.0%<8.0%: CPT | Performed by: INTERNAL MEDICINE

## 2021-01-27 LAB — HEMOGLOBIN A1C: 7.3 % OF TOTAL HGB

## 2021-01-29 ENCOUNTER — OFFICE VISIT (OUTPATIENT)
Dept: INTERNAL MEDICINE CLINIC | Facility: CLINIC | Age: 66
End: 2021-01-29
Payer: COMMERCIAL

## 2021-01-29 VITALS
OXYGEN SATURATION: 96 % | HEART RATE: 96 BPM | BODY MASS INDEX: 30.04 KG/M2 | WEIGHT: 226.63 LBS | SYSTOLIC BLOOD PRESSURE: 126 MMHG | RESPIRATION RATE: 14 BRPM | DIASTOLIC BLOOD PRESSURE: 76 MMHG | HEIGHT: 72.72 IN | TEMPERATURE: 97 F

## 2021-01-29 DIAGNOSIS — Z00.00 ENCOUNTER FOR ANNUAL HEALTH EXAMINATION: Primary | ICD-10-CM

## 2021-01-29 DIAGNOSIS — E78.00 PURE HYPERCHOLESTEROLEMIA: ICD-10-CM

## 2021-01-29 DIAGNOSIS — E11.9 TYPE 2 DIABETES MELLITUS WITHOUT COMPLICATION, WITHOUT LONG-TERM CURRENT USE OF INSULIN (HCC): ICD-10-CM

## 2021-01-29 DIAGNOSIS — I10 ESSENTIAL HYPERTENSION: ICD-10-CM

## 2021-01-29 DIAGNOSIS — Z12.11 COLON CANCER SCREENING: ICD-10-CM

## 2021-01-29 DIAGNOSIS — E11.65 TYPE 2 DIABETES MELLITUS WITH HYPERGLYCEMIA, WITHOUT LONG-TERM CURRENT USE OF INSULIN (HCC): ICD-10-CM

## 2021-01-29 PROCEDURE — 3074F SYST BP LT 130 MM HG: CPT | Performed by: INTERNAL MEDICINE

## 2021-01-29 PROCEDURE — 3078F DIAST BP <80 MM HG: CPT | Performed by: INTERNAL MEDICINE

## 2021-01-29 PROCEDURE — 3008F BODY MASS INDEX DOCD: CPT | Performed by: INTERNAL MEDICINE

## 2021-01-29 PROCEDURE — 99397 PER PM REEVAL EST PAT 65+ YR: CPT | Performed by: INTERNAL MEDICINE

## 2021-01-29 PROCEDURE — G0439 PPPS, SUBSEQ VISIT: HCPCS | Performed by: INTERNAL MEDICINE

## 2021-01-29 PROCEDURE — 96160 PT-FOCUSED HLTH RISK ASSMT: CPT | Performed by: INTERNAL MEDICINE

## 2021-01-29 NOTE — PROGRESS NOTES
HPI:   Akshat Lieberman is a 77year old male who presents for a MA (Medicare Advantage) Supervisit (Once per calendar year).       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 SOCIAL HISTORY:   He  reports that he has never smoked. He has never used smokeless tobacco. He reports previous alcohol use. He reports that he does not use drugs.      REVIEW OF SYSTEMS:   GENER bilaterally, respirations unlabored   Chest Wall:  No tenderness or deformity   Heart:  Regular rate and rhythm, S1, S2 normal, no murmur, rub or gallop   Abdomen:   Soft, non-tender, bowel sounds active all four quadrants,  no masses, no organomegaly MD Raquel, 1/29/2019     General Health     In the past six months, have you lost more than 10 pounds without trying?: 2 - No  Has your appetite been poor?: No  How does the patient maintain a good energy level?: Appropriate Exercise  How would you 23 (Pneumovax)  Covered Once after 65 No vaccine history found Please get once after your 65th birthday    Hepatitis B for Moderate/High Risk No vaccine history found Medium/high risk factors:   End-stage renal disease   Hemophiliacs who received Factor VI

## 2021-01-29 NOTE — PATIENT INSTRUCTIONS
Mars Marquez's SCREENING SCHEDULE   Tests on this list are recommended by your physician but may not be covered, or covered at this frequency, by your insurer. Please check with your insurance carrier before scheduling to verify coverage.     PREVENTATI Screen   Covered every 10 years- more often if abnormal Colonoscopy due on 04/16/2003 Update [facility] if applicable    Flex Sigmoidoscopy Screen  Covered every 5 years No results found for this or any previous visit. No flowsheet data found. B) Orders placed or performed in visit on 01/24/17   • TETANUS, DIPHTHERIA TOXOIDS AND ACELLULAR PERTUSIS VACCINE (TDAP), >7 YEARS, IM USE    This may be covered with your prescription benefits, but Medicare does not cover unless Medically needed    Zoster

## 2021-02-06 DIAGNOSIS — Z23 NEED FOR VACCINATION: ICD-10-CM

## 2021-02-08 ENCOUNTER — IMMUNIZATION (OUTPATIENT)
Dept: LAB | Age: 66
End: 2021-02-08
Attending: HOSPITALIST
Payer: MEDICARE

## 2021-02-08 DIAGNOSIS — Z23 NEED FOR VACCINATION: Primary | ICD-10-CM

## 2021-02-08 PROCEDURE — 0001A SARSCOV2 VAC 30MCG/0.3ML IM: CPT

## 2021-03-01 ENCOUNTER — IMMUNIZATION (OUTPATIENT)
Dept: LAB | Age: 66
End: 2021-03-01
Attending: HOSPITALIST
Payer: MEDICARE

## 2021-03-01 DIAGNOSIS — Z23 NEED FOR VACCINATION: Primary | ICD-10-CM

## 2021-03-01 PROCEDURE — 0002A SARSCOV2 VAC 30MCG/0.3ML IM: CPT

## 2021-03-04 ENCOUNTER — TELEPHONE (OUTPATIENT)
Dept: INTERNAL MEDICINE CLINIC | Facility: CLINIC | Age: 66
End: 2021-03-04

## 2021-03-04 NOTE — TELEPHONE ENCOUNTER
Patient states that he still has the insure one colorectal card and will complete it as soon as possible.

## 2021-03-26 DIAGNOSIS — E11.9 TYPE 2 DIABETES MELLITUS WITHOUT COMPLICATION, WITHOUT LONG-TERM CURRENT USE OF INSULIN (HCC): ICD-10-CM

## 2021-03-26 RX ORDER — METFORMIN HYDROCHLORIDE 500 MG/1
1000 TABLET, EXTENDED RELEASE ORAL
Qty: 180 TABLET | Refills: 1 | Status: SHIPPED | OUTPATIENT
Start: 2021-03-26 | End: 2021-10-06

## 2021-04-07 DIAGNOSIS — I10 ESSENTIAL HYPERTENSION: ICD-10-CM

## 2021-04-07 DIAGNOSIS — E78.00 PURE HYPERCHOLESTEROLEMIA: ICD-10-CM

## 2021-04-08 RX ORDER — ATORVASTATIN CALCIUM 40 MG/1
20 TABLET, FILM COATED ORAL DAILY
Qty: 45 TABLET | Refills: 1 | Status: SHIPPED | OUTPATIENT
Start: 2021-04-08 | End: 2021-08-10

## 2021-04-08 RX ORDER — LISINOPRIL 10 MG/1
10 TABLET ORAL DAILY
Qty: 90 TABLET | Refills: 1 | Status: SHIPPED | OUTPATIENT
Start: 2021-04-08 | End: 2021-10-06

## 2021-08-06 PROCEDURE — 3052F HG A1C>EQUAL 8.0%<EQUAL 9.0%: CPT | Performed by: INTERNAL MEDICINE

## 2021-08-06 PROCEDURE — 3061F NEG MICROALBUMINURIA REV: CPT | Performed by: INTERNAL MEDICINE

## 2021-08-07 LAB
ALBUMIN/GLOBULIN RATIO: 1.6 (CALC) (ref 1–2.5)
ALBUMIN: 4.2 G/DL (ref 3.6–5.1)
ALKALINE PHOSPHATASE: 75 U/L (ref 35–144)
ALT: 14 U/L (ref 9–46)
AST: 11 U/L (ref 10–35)
BILIRUBIN, TOTAL: 0.6 MG/DL (ref 0.2–1.2)
BUN: 22 MG/DL (ref 7–25)
CALCIUM: 9.2 MG/DL (ref 8.6–10.3)
CARBON DIOXIDE: 27 MMOL/L (ref 20–32)
CHLORIDE: 104 MMOL/L (ref 98–110)
CHOL/HDLC RATIO: 4.5 (CALC)
CHOLESTEROL, TOTAL: 171 MG/DL
CREATININE, RANDOM URINE: 130 MG/DL (ref 20–320)
CREATININE: 1.06 MG/DL (ref 0.7–1.25)
EGFR IF AFRICN AM: 83 ML/MIN/1.73M2
EGFR IF NONAFRICN AM: 72 ML/MIN/1.73M2
GLOBULIN: 2.7 G/DL (CALC) (ref 1.9–3.7)
GLUCOSE: 151 MG/DL (ref 65–99)
HDL CHOLESTEROL: 38 MG/DL
HEMOGLOBIN A1C: 8 % OF TOTAL HGB
LDL-CHOLESTEROL: 110 MG/DL (CALC)
MICROALBUMIN/CREATININE RATIO, RANDOM URINE: 4 MCG/MG CREAT
MICROALBUMIN: 0.5 MG/DL
NON-HDL CHOLESTEROL: 133 MG/DL (CALC)
POTASSIUM: 4.7 MMOL/L (ref 3.5–5.3)
PROTEIN, TOTAL: 6.9 G/DL (ref 6.1–8.1)
SODIUM: 136 MMOL/L (ref 135–146)
TRIGLYCERIDES: 122 MG/DL

## 2021-08-10 ENCOUNTER — OFFICE VISIT (OUTPATIENT)
Dept: INTERNAL MEDICINE CLINIC | Facility: CLINIC | Age: 66
End: 2021-08-10
Payer: COMMERCIAL

## 2021-08-10 VITALS
DIASTOLIC BLOOD PRESSURE: 80 MMHG | BODY MASS INDEX: 30.27 KG/M2 | RESPIRATION RATE: 16 BRPM | HEART RATE: 85 BPM | WEIGHT: 228.38 LBS | HEIGHT: 72.72 IN | TEMPERATURE: 97 F | OXYGEN SATURATION: 98 % | SYSTOLIC BLOOD PRESSURE: 128 MMHG

## 2021-08-10 DIAGNOSIS — I10 PRIMARY HYPERTENSION: ICD-10-CM

## 2021-08-10 DIAGNOSIS — E11.9 TYPE 2 DIABETES MELLITUS WITHOUT COMPLICATION, WITHOUT LONG-TERM CURRENT USE OF INSULIN (HCC): ICD-10-CM

## 2021-08-10 DIAGNOSIS — E11.65 TYPE 2 DIABETES MELLITUS WITH HYPERGLYCEMIA, WITHOUT LONG-TERM CURRENT USE OF INSULIN (HCC): Primary | ICD-10-CM

## 2021-08-10 DIAGNOSIS — E78.00 PURE HYPERCHOLESTEROLEMIA: ICD-10-CM

## 2021-08-10 PROCEDURE — 99214 OFFICE O/P EST MOD 30 MIN: CPT | Performed by: INTERNAL MEDICINE

## 2021-08-10 PROCEDURE — 3074F SYST BP LT 130 MM HG: CPT | Performed by: INTERNAL MEDICINE

## 2021-08-10 PROCEDURE — 3079F DIAST BP 80-89 MM HG: CPT | Performed by: INTERNAL MEDICINE

## 2021-08-10 PROCEDURE — 3008F BODY MASS INDEX DOCD: CPT | Performed by: INTERNAL MEDICINE

## 2021-08-10 RX ORDER — L. RHAMNOSUS GG/INULIN 20B-200 MG
1 CAPSULE ORAL DAILY
COMMUNITY
End: 2022-01-17 | Stop reason: ALTCHOICE

## 2021-08-10 RX ORDER — ROSUVASTATIN CALCIUM 20 MG/1
20 TABLET, COATED ORAL NIGHTLY
Qty: 90 TABLET | Refills: 1 | Status: SHIPPED | OUTPATIENT
Start: 2021-08-10 | End: 2022-01-17

## 2021-08-10 NOTE — PROGRESS NOTES
HPI:   Nishant Matute is a 74year old male who presents for recheck of his diabetes. Has not been monitoring sugars  Denies PU/PD, visual change, neuropathy symptoms. Overdue for eye exam    HTN:Denies CP, dyspnea, orthopnea, edema or palpitations.  Cynda Situ Comment: 1 drink per month     Drug use:  No               EXAM:   /80 (BP Location: Right arm, Patient Position: Sitting, Cuff Size: adult)   Pulse 85   Temp 97 °F (36.1 °C) (Temporal)   Resp 16   Ht 6' 0.72\" (1.847 m)   Wt 228 lb 6.4 oz (103.6 kg) Hypercholesterolemia/hyperlipidemia/high cholesterol treatment on statin, LDL not at goal, intolerant of higher dose lipitor, change to crestor , Urine microalbumin/nephropathy management: on ACE, Eye and Depression screens are UTD.   See orders for further

## 2021-09-30 ENCOUNTER — TELEPHONE (OUTPATIENT)
Dept: INTERNAL MEDICINE CLINIC | Facility: CLINIC | Age: 66
End: 2021-09-30

## 2021-10-06 DIAGNOSIS — I10 ESSENTIAL HYPERTENSION: ICD-10-CM

## 2021-10-06 DIAGNOSIS — E78.00 PURE HYPERCHOLESTEROLEMIA: ICD-10-CM

## 2021-10-06 DIAGNOSIS — E11.9 TYPE 2 DIABETES MELLITUS WITHOUT COMPLICATION, WITHOUT LONG-TERM CURRENT USE OF INSULIN (HCC): ICD-10-CM

## 2021-10-06 RX ORDER — METFORMIN HYDROCHLORIDE 500 MG/1
TABLET, EXTENDED RELEASE ORAL
Qty: 180 TABLET | Refills: 0 | Status: SHIPPED | OUTPATIENT
Start: 2021-10-06 | End: 2022-01-06

## 2021-10-06 RX ORDER — ATORVASTATIN CALCIUM 40 MG/1
TABLET, FILM COATED ORAL
Qty: 45 TABLET | Refills: 1 | OUTPATIENT
Start: 2021-10-06

## 2021-10-06 RX ORDER — LISINOPRIL 10 MG/1
TABLET ORAL
Qty: 90 TABLET | Refills: 0 | Status: SHIPPED | OUTPATIENT
Start: 2021-10-06 | End: 2022-01-05

## 2022-01-05 DIAGNOSIS — I10 ESSENTIAL HYPERTENSION: ICD-10-CM

## 2022-01-05 DIAGNOSIS — E11.9 TYPE 2 DIABETES MELLITUS WITHOUT COMPLICATION, WITHOUT LONG-TERM CURRENT USE OF INSULIN (HCC): ICD-10-CM

## 2022-01-05 RX ORDER — LISINOPRIL 10 MG/1
TABLET ORAL
Qty: 90 TABLET | Refills: 0 | Status: SHIPPED | OUTPATIENT
Start: 2022-01-05

## 2022-01-05 NOTE — TELEPHONE ENCOUNTER
Last OV relevant to medication: 8/10/2021  Last refill date: 10/6/2021   #/refills: 180-0  When pt was asked to return for OV: 3 months for DM  Upcoming appt/reason: 1/17/2022  Was pt informed of any over due labs: yes  Lab Results   Component Value Date

## 2022-01-06 RX ORDER — METFORMIN HYDROCHLORIDE 500 MG/1
TABLET, EXTENDED RELEASE ORAL
Qty: 180 TABLET | Refills: 0 | Status: SHIPPED | OUTPATIENT
Start: 2022-01-06

## 2022-01-12 LAB
ALBUMIN/GLOBULIN RATIO: 1.7 (CALC) (ref 1–2.5)
ALBUMIN: 4.2 G/DL (ref 3.6–5.1)
ALKALINE PHOSPHATASE: 70 U/L (ref 35–144)
ALT: 15 U/L (ref 9–46)
AST: 12 U/L (ref 10–35)
BILIRUBIN, TOTAL: 0.5 MG/DL (ref 0.2–1.2)
BUN: 17 MG/DL (ref 7–25)
CALCIUM: 9.1 MG/DL (ref 8.6–10.3)
CARBON DIOXIDE: 29 MMOL/L (ref 20–32)
CHLORIDE: 102 MMOL/L (ref 98–110)
CHOL/HDLC RATIO: 3.7 (CALC)
CHOLESTEROL, TOTAL: 147 MG/DL
CREATININE: 0.95 MG/DL (ref 0.7–1.25)
EGFR IF AFRICN AM: 95 ML/MIN/1.73M2
EGFR IF NONAFRICN AM: 82 ML/MIN/1.73M2
GLOBULIN: 2.5 G/DL (CALC) (ref 1.9–3.7)
GLUCOSE: 151 MG/DL (ref 65–99)
HDL CHOLESTEROL: 40 MG/DL
HEMOGLOBIN A1C: 7.7 % OF TOTAL HGB
LDL-CHOLESTEROL: 85 MG/DL (CALC)
NON-HDL CHOLESTEROL: 107 MG/DL (CALC)
POTASSIUM: 4.4 MMOL/L (ref 3.5–5.3)
PROTEIN, TOTAL: 6.7 G/DL (ref 6.1–8.1)
SODIUM: 138 MMOL/L (ref 135–146)
TRIGLYCERIDES: 127 MG/DL

## 2022-01-17 ENCOUNTER — OFFICE VISIT (OUTPATIENT)
Dept: INTERNAL MEDICINE CLINIC | Facility: CLINIC | Age: 67
End: 2022-01-17
Payer: COMMERCIAL

## 2022-01-17 VITALS
HEIGHT: 72.8 IN | WEIGHT: 223.88 LBS | HEART RATE: 68 BPM | RESPIRATION RATE: 14 BRPM | DIASTOLIC BLOOD PRESSURE: 78 MMHG | BODY MASS INDEX: 29.67 KG/M2 | OXYGEN SATURATION: 98 % | TEMPERATURE: 97 F | SYSTOLIC BLOOD PRESSURE: 116 MMHG

## 2022-01-17 DIAGNOSIS — Z12.11 COLON CANCER SCREENING: ICD-10-CM

## 2022-01-17 DIAGNOSIS — E78.00 PURE HYPERCHOLESTEROLEMIA: ICD-10-CM

## 2022-01-17 DIAGNOSIS — E11.9 TYPE 2 DIABETES MELLITUS WITHOUT COMPLICATION, WITHOUT LONG-TERM CURRENT USE OF INSULIN (HCC): ICD-10-CM

## 2022-01-17 DIAGNOSIS — Z12.11 ENCOUNTER FOR SCREENING FECAL OCCULT BLOOD TESTING: ICD-10-CM

## 2022-01-17 DIAGNOSIS — I10 PRIMARY HYPERTENSION: ICD-10-CM

## 2022-01-17 DIAGNOSIS — Z00.00 ENCOUNTER FOR ANNUAL HEALTH EXAMINATION: Primary | ICD-10-CM

## 2022-01-17 DIAGNOSIS — I10 ESSENTIAL HYPERTENSION: ICD-10-CM

## 2022-01-17 DIAGNOSIS — Z12.5 ENCOUNTER FOR PROSTATE CANCER SCREENING: ICD-10-CM

## 2022-01-17 PROBLEM — E11.65 TYPE 2 DIABETES MELLITUS WITH HYPERGLYCEMIA, WITHOUT LONG-TERM CURRENT USE OF INSULIN (HCC): Chronic | Status: RESOLVED | Noted: 2020-08-06 | Resolved: 2022-01-17

## 2022-01-17 PROCEDURE — 99397 PER PM REEVAL EST PAT 65+ YR: CPT | Performed by: INTERNAL MEDICINE

## 2022-01-17 PROCEDURE — 3074F SYST BP LT 130 MM HG: CPT | Performed by: INTERNAL MEDICINE

## 2022-01-17 PROCEDURE — 82272 OCCULT BLD FECES 1-3 TESTS: CPT | Performed by: INTERNAL MEDICINE

## 2022-01-17 PROCEDURE — G0439 PPPS, SUBSEQ VISIT: HCPCS | Performed by: INTERNAL MEDICINE

## 2022-01-17 PROCEDURE — 96160 PT-FOCUSED HLTH RISK ASSMT: CPT | Performed by: INTERNAL MEDICINE

## 2022-01-17 PROCEDURE — 82270 OCCULT BLOOD FECES: CPT | Performed by: INTERNAL MEDICINE

## 2022-01-17 PROCEDURE — 3078F DIAST BP <80 MM HG: CPT | Performed by: INTERNAL MEDICINE

## 2022-01-17 PROCEDURE — 3008F BODY MASS INDEX DOCD: CPT | Performed by: INTERNAL MEDICINE

## 2022-01-17 RX ORDER — ROSUVASTATIN CALCIUM 20 MG/1
20 TABLET, COATED ORAL NIGHTLY
Qty: 90 TABLET | Refills: 1 | Status: SHIPPED | OUTPATIENT
Start: 2022-01-17

## 2022-01-17 NOTE — PATIENT INSTRUCTIONS
Wilmar Marquez's SCREENING SCHEDULE   Tests on this list are recommended by your physician but may not be covered, or covered at this frequency, by your insurer. Please check with your insurance carrier before scheduling to verify coverage.    RONNELL after 65 Prevnar 13: 01/29/2019    [id number]: 01/13/2020     No recommendations at this time    Hepatitis B One screening covered for patients with certain risk factors   -  No recommendations at this time    Tetanus Toxoid Not covered by Medicare Part B

## 2022-01-17 NOTE — PROGRESS NOTES
HPI:   Zulma Pinto is a 74year old male who presents for a MA (Medicare Advantage) Supervisit (Once per calendar year).   Retired IT      He volunteers for [email or web address]      He is also here for DM f/u- doing fine.    He (OPHTHALMOLOGY)  Amaya Dinh MD (OPHTHALMOLOGY)    Patient Active Problem List:     HTN (hypertension)     Hyperlipidemia     Type 2 diabetes mellitus without complication, without long-term current use of insulin ([facility] [address]    Wt Readings from Last 3 En SOCIAL HISTORY:   He  reports that he has never smoked. He has never used smokeless tobacco. He reports previous alcohol use. He reports that he does not use drugs.      ROS:  CONSTITUTIONAL: no night sweats, fevers, unexplained wt loss,   SKIN no rashes, tenderness/mass/nodules, no carotid bruit or JVD   Back:   Symmetric, no curvature, ROM normal, no CVA tenderness   Lungs:   Clear to auscultation bilaterally, respirations unlabored   Chest Wall:  No tenderness or deformity   Heart:  Regular rate and rhyt issues and agrees to the plan. Reinforced healthy diet, lifestyle, and exercise. Return in about 3 months (around 4/17/2022) for diabetes.      Cami Hsu MD, 1/29/2019     General Health     In the past six months, have you lost more than 1 Pneumococcal 13 (Prevnar)  Covered Once after 65 No vaccine history found Please get once after your 65th birthday    Pneumococcal 23 (Pneumovax)  Covered Once after 65 No vaccine history found Please get once after your 65th birthday    Hepatitis B for Mo

## 2022-02-25 ENCOUNTER — TELEPHONE (OUTPATIENT)
Dept: INTERNAL MEDICINE CLINIC | Facility: CLINIC | Age: 67
End: 2022-02-25

## 2022-03-02 ENCOUNTER — MED REC SCAN ONLY (OUTPATIENT)
Dept: INTERNAL MEDICINE CLINIC | Facility: CLINIC | Age: 67
End: 2022-03-02

## 2022-04-07 RX ORDER — LISINOPRIL 10 MG/1
TABLET ORAL
Qty: 90 TABLET | Refills: 0 | Status: SHIPPED | OUTPATIENT
Start: 2022-04-07

## 2022-04-07 NOTE — TELEPHONE ENCOUNTER
Hypertension Medications Protocol Passed 04/07/2022 09:31 AM   Protocol Details  CMP or BMP in past 12 months    Last serum creatinine< 2.0    Appointment in past 6 or next 3 months     Future Appointments   Date Time Provider Emani Dillard   4/28/2022 11:40 AM Sandra Martínez MD [facility]

## 2022-04-18 RX ORDER — METFORMIN HYDROCHLORIDE 500 MG/1
TABLET, EXTENDED RELEASE ORAL
Qty: 180 TABLET | Refills: 0 | Status: SHIPPED | OUTPATIENT
Start: 2022-04-18

## 2022-04-18 NOTE — TELEPHONE ENCOUNTER
Diabetic Medication Protocol Failed 04/16/2022 04:55 PM   Protocol Details  Last HgBA1C < 7.5    HgBA1C procedure resulted in past 6 months    Microalbumin procedure in past 12 months or taking ACE/ARB    Appointment in past 6 or next 3 months     Last OV relevant to medication: 1/17/22   Last refill date: 1/6/22 180     #/refills: 0  When pt was asked to return for OV: 3 months   Upcoming appt/reason:   Future Appointments   Date Time Provider Emani Dillard   4/28/2022 11:40 AM Kamaljit Duncan MD [facility]       Was pt informed of any over due labs: due   Lab Results   Component Value Date     (H) 01/11/2022    BUN 17 01/11/2022    BUNCREA NOT APPLICABLE 61/97/3216    CREATSERUM 0.95 01/11/2022    GFRNAA 82 01/11/2022    GFRAA 95 01/11/2022    CA 9.1 01/11/2022    ALKPHO 70 01/11/2022    AST 12 01/11/2022    ALT 15 01/11/2022    BILT 0.5 01/11/2022    TP 6.7 01/11/2022    ALB 4.2 01/11/2022    GLOBULIN 2.5 01/11/2022    AGRATIO 1.7 01/11/2022     01/11/2022    K 4.4 01/11/2022     01/11/2022    CO2 29 01/11/2022     Lab Results   Component Value Date    A1C 7.7 (H) 01/11/2022

## 2022-07-05 DIAGNOSIS — I10 ESSENTIAL HYPERTENSION: ICD-10-CM

## 2022-07-06 RX ORDER — LISINOPRIL 10 MG/1
TABLET ORAL
Qty: 30 TABLET | Refills: 0 | Status: SHIPPED | OUTPATIENT
Start: 2022-07-06

## 2022-07-06 NOTE — TELEPHONE ENCOUNTER
Last OV relevant to medication: 01/17/2022  Last refill date:4/7/2022   #/refills: 90-0  When pt was asked to return for OV: Return in about 3 months (around 4/17/2022) for diabetes.   Upcoming appt/reason: n/a, [employer] message sent  Was pt informed of any over due labs: Yes, [employer] message sent   Lab Results   Component Value Date     (H) 01/11/2022    BUN 17 01/11/2022    BUNCREA NOT APPLICABLE 22/58/2528    CREATSERUM 0.95 01/11/2022    GFRNAA 82 01/11/2022    GFRAA 95 01/11/2022    CA 9.1 01/11/2022    ALKPHO 70 01/11/2022    AST 12 01/11/2022    ALT 15 01/11/2022    BILT 0.5 01/11/2022    TP 6.7 01/11/2022    ALB 4.2 01/11/2022    GLOBULIN 2.5 01/11/2022    AGRATIO 1.7 01/11/2022     01/11/2022    K 4.4 01/11/2022     01/11/2022    CO2 29 01/11/2022

## 2022-07-20 DIAGNOSIS — E11.9 TYPE 2 DIABETES MELLITUS WITHOUT COMPLICATION, WITHOUT LONG-TERM CURRENT USE OF INSULIN (HCC): ICD-10-CM

## 2022-07-20 RX ORDER — METFORMIN HYDROCHLORIDE 500 MG/1
TABLET, EXTENDED RELEASE ORAL
Qty: 60 TABLET | Refills: 0 | Status: SHIPPED | OUTPATIENT
Start: 2022-07-20

## 2022-07-20 NOTE — TELEPHONE ENCOUNTER
Last OV relevant to medication: 01/17/2022   Last refill date: 04/18/2022   #/refills: 180-0  When pt was asked to return for OV:Return in about 3 months (around 4/17/2022) for diabetes  Upcoming appt/reason:08/09/2022 DM  Was pt informed of any over due labs: Due prior to DM appt  Lab Results   Component Value Date    A1C 7.7 (H) 01/11/2022     Lab Results   Component Value Date     (H) 01/11/2022     Lab Results   Component Value Date    MICROALBUMIN 0.5 08/06/2021    CREAUR 130 08/06/2021    MALBCREACALC 4 08/06/2021

## 2022-08-05 DIAGNOSIS — I10 ESSENTIAL HYPERTENSION: ICD-10-CM

## 2022-08-05 RX ORDER — LISINOPRIL 10 MG/1
TABLET ORAL
Qty: 90 TABLET | Refills: 0 | Status: SHIPPED | OUTPATIENT
Start: 2022-08-05

## 2022-08-08 PROCEDURE — 3051F HG A1C>EQUAL 7.0%<8.0%: CPT | Performed by: INTERNAL MEDICINE

## 2022-08-09 ENCOUNTER — OFFICE VISIT (OUTPATIENT)
Dept: INTERNAL MEDICINE CLINIC | Facility: CLINIC | Age: 67
End: 2022-08-09
Payer: COMMERCIAL

## 2022-08-09 VITALS
TEMPERATURE: 97 F | BODY MASS INDEX: 29.55 KG/M2 | OXYGEN SATURATION: 98 % | DIASTOLIC BLOOD PRESSURE: 74 MMHG | HEART RATE: 75 BPM | WEIGHT: 223 LBS | SYSTOLIC BLOOD PRESSURE: 118 MMHG | HEIGHT: 72.8 IN | RESPIRATION RATE: 14 BRPM

## 2022-08-09 DIAGNOSIS — E11.9 TYPE 2 DIABETES MELLITUS WITHOUT COMPLICATION, WITHOUT LONG-TERM CURRENT USE OF INSULIN (HCC): Primary | ICD-10-CM

## 2022-08-09 DIAGNOSIS — E78.00 PURE HYPERCHOLESTEROLEMIA: ICD-10-CM

## 2022-08-09 DIAGNOSIS — I10 PRIMARY HYPERTENSION: ICD-10-CM

## 2022-08-09 LAB
CREAT UR-SCNC: 134 MG/DL
HEMOGLOBIN A1C: 7.6 % OF TOTAL HGB
MICROALBUMIN UR-MCNC: 0.64 MG/DL
MICROALBUMIN/CREAT 24H UR-RTO: 4.8 UG/MG (ref ?–30)
TOTAL PSA: 1.9 NG/ML

## 2022-08-09 PROCEDURE — 82570 ASSAY OF URINE CREATININE: CPT | Performed by: INTERNAL MEDICINE

## 2022-08-09 PROCEDURE — 82043 UR ALBUMIN QUANTITATIVE: CPT | Performed by: INTERNAL MEDICINE

## 2022-08-09 PROCEDURE — 3078F DIAST BP <80 MM HG: CPT | Performed by: INTERNAL MEDICINE

## 2022-08-09 PROCEDURE — 3074F SYST BP LT 130 MM HG: CPT | Performed by: INTERNAL MEDICINE

## 2022-08-09 PROCEDURE — 99214 OFFICE O/P EST MOD 30 MIN: CPT | Performed by: INTERNAL MEDICINE

## 2022-08-09 PROCEDURE — 3008F BODY MASS INDEX DOCD: CPT | Performed by: INTERNAL MEDICINE

## 2022-08-10 RX ORDER — ROSUVASTATIN CALCIUM 20 MG/1
TABLET, COATED ORAL
Qty: 90 TABLET | Refills: 1 | Status: SHIPPED | OUTPATIENT
Start: 2022-08-10

## 2022-08-19 DIAGNOSIS — E11.9 TYPE 2 DIABETES MELLITUS WITHOUT COMPLICATION, WITHOUT LONG-TERM CURRENT USE OF INSULIN (HCC): ICD-10-CM

## 2022-08-22 RX ORDER — METFORMIN HYDROCHLORIDE 500 MG/1
TABLET, EXTENDED RELEASE ORAL
Qty: 180 TABLET | Refills: 1 | Status: SHIPPED | OUTPATIENT
Start: 2022-08-22

## 2022-08-22 NOTE — TELEPHONE ENCOUNTER
Last OV relevant to medication: 8-9-2022 DM  Last refill date: 7-   #/refills: 60-0   When pt was asked to return for OV: 6 months (around 2/9/2023) for supervisit  Upcoming appt/reason: n/a  Was pt informed of any over due labs: due in February 2023    Pt failed protocol d/t A1C being over 7.1  Lab Results   Component Value Date    A1C 7.6 (H) 08/08/2022     Lab Results   Component Value Date     (H) 01/11/2022     Lab Results   Component Value Date    MALBP 0.64 08/09/2022    CREUR 134.00 08/09/2022    MICROALBUMIN 0.5 08/06/2021    CREAUR 130 08/06/2021    MALBCREACALC 4 08/06/2021

## 2022-11-04 DIAGNOSIS — I10 ESSENTIAL HYPERTENSION: ICD-10-CM

## 2022-11-04 RX ORDER — LISINOPRIL 10 MG/1
TABLET ORAL
Qty: 90 TABLET | Refills: 0 | Status: SHIPPED | OUTPATIENT
Start: 2022-11-04

## 2022-11-04 NOTE — TELEPHONE ENCOUNTER
rx sent 90/0 per protocol   Sent mychart to pt reminding him to schedule supervisit in february per dr jada bautista and provided instructions on when to complete labs

## 2023-02-08 ENCOUNTER — TELEPHONE (OUTPATIENT)
Dept: INTERNAL MEDICINE CLINIC | Facility: CLINIC | Age: 68
End: 2023-02-08

## 2023-02-08 DIAGNOSIS — Z12.11 ENCOUNTER FOR SCREENING COLONOSCOPY: Primary | ICD-10-CM

## 2023-02-20 DIAGNOSIS — E11.9 TYPE 2 DIABETES MELLITUS WITHOUT COMPLICATION, WITHOUT LONG-TERM CURRENT USE OF INSULIN (HCC): ICD-10-CM

## 2023-02-22 RX ORDER — METFORMIN HYDROCHLORIDE 500 MG/1
1000 TABLET, EXTENDED RELEASE ORAL
Qty: 60 TABLET | Refills: 0 | Status: SHIPPED | OUTPATIENT
Start: 2023-02-22 | End: 2023-03-27

## 2023-02-22 NOTE — TELEPHONE ENCOUNTER
Last OV relevant to medication: 8/29/22  Last refill date: 8/22/22 #180/refills: 0  When pt was asked to return for OV: 2/9/23  Upcoming appt/reason:   Future Appointments   Date Time Provider Emani Dillard   4/13/2023 12:00 PM Ernestine Carvajal MD [facility] Helio Valiente   Was pt informed of any over due labs: reminder message sent to patient.   Lab Results   Component Value Date    A1C 7.6 (H) 08/08/2022     Lab Results   Component Value Date    MALBP 0.64 08/09/2022    CREUR 134.00 08/09/2022    MICROALBUMIN 0.5 08/06/2021    CREAUR 130 08/06/2021    MALBCREACALC 4 08/06/2021

## 2023-03-24 DIAGNOSIS — E11.9 TYPE 2 DIABETES MELLITUS WITHOUT COMPLICATION, WITHOUT LONG-TERM CURRENT USE OF INSULIN (HCC): ICD-10-CM

## 2023-03-27 RX ORDER — METFORMIN HYDROCHLORIDE 500 MG/1
TABLET, EXTENDED RELEASE ORAL
Qty: 60 TABLET | Refills: 0 | Status: SHIPPED | OUTPATIENT
Start: 2023-03-27

## 2023-03-27 NOTE — TELEPHONE ENCOUNTER
Last OV relevant to medication: 8/9/22  Last refill date: 2/22/23 #60/refills: 0  When pt was asked to return for OV: 2/9/23  Upcoming appt/reason:   Future Appointments   Date Time Provider Emani Dillard   4/13/2023 12:00 PM Kirstin Russo MD [facility] N Molly Shah   Was pt informed of any over due labs: pt reminded of labs- please see TE from 2/20 and 2/21  Lab Results   Component Value Date    A1C 7.6 (H) 08/08/2022     Lab Results   Component Value Date    MALBP 0.64 08/09/2022    CREUR 134.00 08/09/2022    MICROALBUMIN 0.5 08/06/2021    CREAUR 130 08/06/2021    MALBCREACALC 4 08/06/2021

## 2023-03-31 DIAGNOSIS — I10 ESSENTIAL HYPERTENSION: ICD-10-CM

## 2023-03-31 RX ORDER — LISINOPRIL 10 MG/1
TABLET ORAL
Qty: 30 TABLET | Refills: 0 | Status: SHIPPED | OUTPATIENT
Start: 2023-03-31

## 2023-03-31 NOTE — TELEPHONE ENCOUNTER
Last OV relevant to medication: 8/9/22  Last refill date: 2/1/23   #/refills: 1  When pt was asked to return for OV: 6 months  Upcoming appt/reason: supervisit  Was pt informed of any over due labs: yes\  Lab Results   Component Value Date     (H) 01/11/2022    BUN 17 01/11/2022    BUNCREA NOT APPLICABLE 61/11/7445    CREATSERUM 0.95 01/11/2022    GFRNAA 82 01/11/2022    GFRAA 95 01/11/2022    CA 9.1 01/11/2022    ALKPHO 70 01/11/2022    AST 12 01/11/2022    ALT 15 01/11/2022    BILT 0.5 01/11/2022    TP 6.7 01/11/2022    ALB 4.2 01/11/2022    GLOBULIN 2.5 01/11/2022    AGRATIO 1.7 01/11/2022     01/11/2022    K 4.4 01/11/2022     01/11/2022    CO2 29 01/11/2022     Lab Results   Component Value Date     (H) 01/11/2022    BUN 17 01/11/2022    CREATSERUM 0.95 01/11/2022    BUNCREA NOT APPLICABLE 05/82/0000    GFRAA 95 01/11/2022    GFRNAA 82 01/11/2022    CA 9.1 01/11/2022     01/11/2022    K 4.4 01/11/2022     01/11/2022    CO2 29 01/11/2022

## 2023-04-11 ENCOUNTER — TELEPHONE (OUTPATIENT)
Dept: INTERNAL MEDICINE CLINIC | Facility: CLINIC | Age: 68
End: 2023-04-11

## 2023-04-11 LAB
AMB EXT BILIRUBIN, TOTAL: 0.5 MG/DL
AMB EXT BUN: 22 MG/DL
AMB EXT CALCIUM: 9.1
AMB EXT CARBON DIOXIDE: 27
AMB EXT CHLORIDE: 104
AMB EXT CHOL/HDL RATIO: 3.8
AMB EXT CHOLESTEROL, TOTAL: 141 MG/DL
AMB EXT CMP ALT: 13 U/L
AMB EXT CMP AST: 11 U/L
AMB EXT CREATININE: 1.17 MG/DL
AMB EXT EGFR NON-AA: 67
AMB EXT GLUCOSE: 182 MG/DL
AMB EXT HDL CHOLESTEROL: 37 MG/DL
AMB EXT HGBA1C: 8.3 %
AMB EXT LDL CHOLESTEROL, DIRECT: 79 MG/DL
AMB EXT NON HDL CHOL: 104 MG/DL
AMB EXT POSTASSIUM: 4.3 MMOL/L
AMB EXT SODIUM: 137 MMOL/L
AMB EXT TOTAL PROTEIN: 6.4
AMB EXT TRIGLYCERIDES: 146 MG/DL

## 2023-04-11 PROCEDURE — 3052F HG A1C>EQUAL 8.0%<EQUAL 9.0%: CPT | Performed by: INTERNAL MEDICINE

## 2023-04-11 NOTE — TELEPHONE ENCOUNTER
Received labs from [employer] [facility] ordered by . Entered in External result console. In 's bin for review, thank you.     Future Appointments   Date Time Provider Emani Dillard   4/13/2023 12:00 PM Sabina Cornell MD [facility] Shamar Goldman

## 2023-04-11 NOTE — TELEPHONE ENCOUNTER
Incoming (mail or fax):  fax  Received from:  [facility]  Documentation given to:  Triage TR      [facility] lab results

## 2023-04-13 ENCOUNTER — OFFICE VISIT (OUTPATIENT)
Dept: INTERNAL MEDICINE CLINIC | Facility: CLINIC | Age: 68
End: 2023-04-13
Payer: COMMERCIAL

## 2023-04-13 VITALS
HEART RATE: 93 BPM | WEIGHT: 225 LBS | HEIGHT: 72 IN | SYSTOLIC BLOOD PRESSURE: 126 MMHG | DIASTOLIC BLOOD PRESSURE: 78 MMHG | OXYGEN SATURATION: 98 % | RESPIRATION RATE: 20 BRPM | TEMPERATURE: 98 F | BODY MASS INDEX: 30.48 KG/M2

## 2023-04-13 DIAGNOSIS — Z12.11 ENCOUNTER FOR SCREENING FECAL OCCULT BLOOD TESTING: ICD-10-CM

## 2023-04-13 DIAGNOSIS — E78.00 PURE HYPERCHOLESTEROLEMIA: ICD-10-CM

## 2023-04-13 DIAGNOSIS — Z00.00 ENCOUNTER FOR ANNUAL HEALTH EXAMINATION: Primary | ICD-10-CM

## 2023-04-13 DIAGNOSIS — Z12.5 SCREENING FOR PROSTATE CANCER: ICD-10-CM

## 2023-04-13 DIAGNOSIS — I10 PRIMARY HYPERTENSION: ICD-10-CM

## 2023-04-13 DIAGNOSIS — E11.65 TYPE 2 DIABETES MELLITUS WITH HYPERGLYCEMIA, WITHOUT LONG-TERM CURRENT USE OF INSULIN (HCC): Chronic | ICD-10-CM

## 2023-04-13 PROBLEM — E11.9 TYPE 2 DIABETES MELLITUS WITHOUT COMPLICATION, WITHOUT LONG-TERM CURRENT USE OF INSULIN (HCC): Status: RESOLVED | Noted: 2017-04-28 | Resolved: 2023-04-13

## 2023-04-13 PROCEDURE — 82270 OCCULT BLOOD FECES: CPT | Performed by: INTERNAL MEDICINE

## 2023-04-13 PROCEDURE — 82272 OCCULT BLD FECES 1-3 TESTS: CPT | Performed by: INTERNAL MEDICINE

## 2023-04-13 RX ORDER — BLOOD SUGAR DIAGNOSTIC
1 STRIP MISCELLANEOUS 2 TIMES DAILY
Qty: 200 STRIP | Refills: 0 | Status: SHIPPED | OUTPATIENT
Start: 2023-04-13

## 2023-04-13 RX ORDER — LISINOPRIL 10 MG/1
10 TABLET ORAL DAILY
Qty: 90 TABLET | Refills: 1 | Status: SHIPPED | OUTPATIENT
Start: 2023-04-13

## 2023-04-13 RX ORDER — METFORMIN HYDROCHLORIDE 500 MG/1
1000 TABLET, EXTENDED RELEASE ORAL 2 TIMES DAILY WITH MEALS
Qty: 360 TABLET | Refills: 0 | Status: SHIPPED | OUTPATIENT
Start: 2023-04-13

## 2023-04-13 RX ORDER — METFORMIN HYDROCHLORIDE 500 MG/1
1000 TABLET, EXTENDED RELEASE ORAL
Qty: 90 TABLET | Refills: 0 | Status: CANCELLED | OUTPATIENT
Start: 2023-04-13

## 2023-04-18 ENCOUNTER — MED REC SCAN ONLY (OUTPATIENT)
Dept: INTERNAL MEDICINE CLINIC | Facility: CLINIC | Age: 68
End: 2023-04-18

## 2023-04-28 RX ORDER — ROSUVASTATIN CALCIUM 20 MG/1
20 TABLET, COATED ORAL NIGHTLY
Qty: 90 TABLET | Refills: 1 | Status: SHIPPED | OUTPATIENT
Start: 2023-04-28

## 2023-07-06 DIAGNOSIS — E11.65 TYPE 2 DIABETES MELLITUS WITH HYPERGLYCEMIA, WITHOUT LONG-TERM CURRENT USE OF INSULIN (HCC): Chronic | ICD-10-CM

## 2023-07-10 RX ORDER — METFORMIN HYDROCHLORIDE 500 MG/1
1000 TABLET, EXTENDED RELEASE ORAL 2 TIMES DAILY WITH MEALS
Qty: 360 TABLET | Refills: 0 | Status: SHIPPED | OUTPATIENT
Start: 2023-07-10

## 2023-07-10 NOTE — TELEPHONE ENCOUNTER
Last OV relevant to medication: 4/13/23  Last refill date: 4/13/23 #360/refills: 0  When pt was asked to return for OV: 7/13/23  Upcoming appt/reason:   Future Appointments   Date Time Provider Emani Gilma   7/18/2023 12:40 PM Ravin Grijalva MD [facility] N Adamcodie Arrington   Was pt informed of any over due labs: due prior to apt, PSA due August  Lab Results   Component Value Date    A1C 8.3 04/11/2023       Lab Results   Component Value Date    MALBP 0.64 08/09/2022    CREUR 134.00 08/09/2022    MICROALBUMIN 0.5 08/06/2021    CREAUR 130 08/06/2021    MALBCREACALC 4 08/06/2021

## 2023-07-15 DIAGNOSIS — E11.65 TYPE 2 DIABETES MELLITUS WITH HYPERGLYCEMIA, WITHOUT LONG-TERM CURRENT USE OF INSULIN (HCC): Chronic | ICD-10-CM

## 2023-07-17 RX ORDER — BLOOD SUGAR DIAGNOSTIC
STRIP MISCELLANEOUS
Qty: 200 STRIP | Refills: 0 | Status: SHIPPED | OUTPATIENT
Start: 2023-07-17

## 2023-07-17 NOTE — TELEPHONE ENCOUNTER
Name from pharmacy: [address]          Will file in chart as: Alex Delvalle In [address] [facility]: [address] 2 (TWO) TIMES DAILY.**E11.65    Disp: 200 strip    Refills: 0 (Pharmacy requested: Not specified)    Start: 7/15/2023    Class: Normal    Non-formulary For: Type 2 diabetes mellitus with hyperglycemia, without long-term current use of insulin (HCC)    Last ordered: 3 months ago by Joshua Khan MD Last refill: 4/13/2023    Rx #: [id number]    Diabetic Supplies Protocol Vcaqru55/15/2023 10:07 AM    Appointment in the past 12 or next 3 months      To be filled at: [facility] [address]. [phone], [phone]        Future Appointments   Date Time Provider Emani Dillard   9/6/2023 10:20 AM Hassie Sacks, MD [facility] Bianka Chilel

## 2023-09-01 PROCEDURE — 3061F NEG MICROALBUMINURIA REV: CPT | Performed by: INTERNAL MEDICINE

## 2023-09-01 PROCEDURE — 3051F HG A1C>EQUAL 7.0%<8.0%: CPT | Performed by: INTERNAL MEDICINE

## 2023-09-02 LAB
CREATININE, RANDOM URINE: 80 MG/DL (ref 20–320)
HEMOGLOBIN A1C: 7.3 % OF TOTAL HGB
MICROALBUMIN: <0.2 MG/DL
PSA, TOTAL: 2.65 NG/ML

## 2023-09-06 ENCOUNTER — OFFICE VISIT (OUTPATIENT)
Dept: INTERNAL MEDICINE CLINIC | Facility: CLINIC | Age: 68
End: 2023-09-06
Payer: COMMERCIAL

## 2023-09-06 VITALS
OXYGEN SATURATION: 94 % | TEMPERATURE: 98 F | DIASTOLIC BLOOD PRESSURE: 64 MMHG | RESPIRATION RATE: 20 BRPM | HEART RATE: 81 BPM | WEIGHT: 225.63 LBS | BODY MASS INDEX: 30.56 KG/M2 | SYSTOLIC BLOOD PRESSURE: 100 MMHG | HEIGHT: 72 IN

## 2023-09-06 DIAGNOSIS — E11.65 TYPE 2 DIABETES MELLITUS WITH HYPERGLYCEMIA, WITHOUT LONG-TERM CURRENT USE OF INSULIN (HCC): Chronic | ICD-10-CM

## 2023-09-06 DIAGNOSIS — I10 PRIMARY HYPERTENSION: Primary | ICD-10-CM

## 2023-09-06 DIAGNOSIS — E78.00 PURE HYPERCHOLESTEROLEMIA: ICD-10-CM

## 2023-09-06 PROCEDURE — 99214 OFFICE O/P EST MOD 30 MIN: CPT | Performed by: INTERNAL MEDICINE

## 2023-09-06 PROCEDURE — 1159F MED LIST DOCD IN RCRD: CPT | Performed by: INTERNAL MEDICINE

## 2023-09-06 PROCEDURE — 3078F DIAST BP <80 MM HG: CPT | Performed by: INTERNAL MEDICINE

## 2023-09-06 PROCEDURE — 1170F FXNL STATUS ASSESSED: CPT | Performed by: INTERNAL MEDICINE

## 2023-09-06 PROCEDURE — 3008F BODY MASS INDEX DOCD: CPT | Performed by: INTERNAL MEDICINE

## 2023-09-06 PROCEDURE — 3074F SYST BP LT 130 MM HG: CPT | Performed by: INTERNAL MEDICINE

## 2023-09-06 RX ORDER — METFORMIN HYDROCHLORIDE 500 MG/1
1000 TABLET, EXTENDED RELEASE ORAL 2 TIMES DAILY WITH MEALS
Qty: 360 TABLET | Refills: 0 | Status: SHIPPED | OUTPATIENT
Start: 2023-09-06

## 2023-10-03 ENCOUNTER — APPOINTMENT (OUTPATIENT)
Dept: GENERAL RADIOLOGY | Age: 68
End: 2023-10-03
Attending: EMERGENCY MEDICINE

## 2023-10-03 ENCOUNTER — HOSPITAL ENCOUNTER (OUTPATIENT)
Age: 68
Discharge: HOME | End: 2023-10-03

## 2023-10-03 VITALS
HEART RATE: 80 BPM | SYSTOLIC BLOOD PRESSURE: 142 MMHG | OXYGEN SATURATION: 95 % | TEMPERATURE: 97 F | BODY MASS INDEX: 31 KG/M2 | DIASTOLIC BLOOD PRESSURE: 80 MMHG | WEIGHT: 225 LBS | RESPIRATION RATE: 18 BRPM

## 2023-10-03 DIAGNOSIS — J40 BRONCHITIS: Primary | ICD-10-CM

## 2023-10-03 LAB — SARS-COV-2 RNA RESP QL NAA+PROBE: NOT DETECTED

## 2023-10-03 PROCEDURE — 99204 OFFICE O/P NEW MOD 45 MIN: CPT

## 2023-10-03 PROCEDURE — 71046 X-RAY EXAM CHEST 2 VIEWS: CPT | Performed by: EMERGENCY MEDICINE

## 2023-10-03 PROCEDURE — 94640 AIRWAY INHALATION TREATMENT: CPT

## 2023-10-03 PROCEDURE — 99214 OFFICE O/P EST MOD 30 MIN: CPT

## 2023-10-03 RX ORDER — ALBUTEROL SULFATE 90 UG/1
2 AEROSOL, METERED RESPIRATORY (INHALATION) EVERY 4 HOURS PRN
Qty: 1 EACH | Refills: 0 | Status: SHIPPED | OUTPATIENT
Start: 2023-10-03 | End: 2023-11-02

## 2023-10-03 RX ORDER — BENZONATATE 100 MG/1
100 CAPSULE ORAL 3 TIMES DAILY PRN
Qty: 30 CAPSULE | Refills: 0 | Status: SHIPPED | OUTPATIENT
Start: 2023-10-03 | End: 2023-11-02

## 2023-10-03 RX ORDER — IPRATROPIUM BROMIDE AND ALBUTEROL SULFATE 2.5; .5 MG/3ML; MG/3ML
3 SOLUTION RESPIRATORY (INHALATION) ONCE
Status: COMPLETED | OUTPATIENT
Start: 2023-10-03 | End: 2023-10-03

## 2023-10-03 NOTE — DISCHARGE INSTRUCTIONS
Use medications as directed. Follow-up with your primary care doctor if symptoms do not improve next week.

## 2023-10-09 DIAGNOSIS — I10 PRIMARY HYPERTENSION: ICD-10-CM

## 2023-10-11 RX ORDER — LISINOPRIL 10 MG/1
10 TABLET ORAL DAILY
Qty: 90 TABLET | Refills: 0 | Status: SHIPPED | OUTPATIENT
Start: 2023-10-11

## 2023-10-11 RX ORDER — ROSUVASTATIN CALCIUM 20 MG/1
20 TABLET, COATED ORAL NIGHTLY
Qty: 90 TABLET | Refills: 0 | Status: SHIPPED | OUTPATIENT
Start: 2023-10-11

## 2023-10-11 NOTE — TELEPHONE ENCOUNTER
Hypertension Medications Protocol Snvmcv78/09/2023 12:32 PM   Protocol Details CMP or BMP in past 12 months    Last serum creatinine< 2.0    Appointment in past 6 or next 3 months    2. Primary hypertension  Continue meds.    Future Appointments   Date Time Provider Emani Dillard   12/27/2023 10:40 AM April Mcallister MD [facility] N Sandra Guzmán

## 2023-12-07 DIAGNOSIS — E11.65 TYPE 2 DIABETES MELLITUS WITH HYPERGLYCEMIA, WITHOUT LONG-TERM CURRENT USE OF INSULIN (HCC): Chronic | ICD-10-CM

## 2023-12-07 RX ORDER — EMPAGLIFLOZIN 10 MG/1
10 TABLET, FILM COATED ORAL DAILY
Qty: 90 TABLET | Refills: 0 | Status: SHIPPED | OUTPATIENT
Start: 2023-12-07

## 2023-12-22 PROCEDURE — 3051F HG A1C>EQUAL 7.0%<8.0%: CPT | Performed by: INTERNAL MEDICINE

## 2023-12-23 LAB
ALBUMIN/GLOBULIN RATIO: 1.6 (CALC) (ref 1–2.5)
ALBUMIN: 4 G/DL (ref 3.6–5.1)
ALKALINE PHOSPHATASE: 57 U/L (ref 35–144)
ALT: 11 U/L (ref 9–46)
AST: 11 U/L (ref 10–35)
BILIRUBIN, TOTAL: 0.5 MG/DL (ref 0.2–1.2)
BUN: 22 MG/DL (ref 7–25)
CALCIUM: 8.9 MG/DL (ref 8.6–10.3)
CARBON DIOXIDE: 26 MMOL/L (ref 20–32)
CHLORIDE: 105 MMOL/L (ref 98–110)
CHOL/HDLC RATIO: 3.5 (CALC)
CHOLESTEROL, TOTAL: 130 MG/DL
CREATININE: 1.01 MG/DL (ref 0.7–1.28)
EGFR: 80 ML/MIN/1.73M2
GLOBULIN: 2.5 G/DL (CALC) (ref 1.9–3.7)
GLUCOSE: 129 MG/DL (ref 65–99)
HDL CHOLESTEROL: 37 MG/DL
HEMOGLOBIN A1C: 7.2 % OF TOTAL HGB
LDL-CHOLESTEROL: 71 MG/DL (CALC)
NON-HDL CHOLESTEROL: 93 MG/DL (CALC)
POTASSIUM: 4.2 MMOL/L (ref 3.5–5.3)
PROTEIN, TOTAL: 6.5 G/DL (ref 6.1–8.1)
SODIUM: 139 MMOL/L (ref 135–146)
TRIGLYCERIDES: 132 MG/DL

## 2023-12-27 ENCOUNTER — OFFICE VISIT (OUTPATIENT)
Dept: INTERNAL MEDICINE CLINIC | Facility: CLINIC | Age: 68
End: 2023-12-27
Payer: COMMERCIAL

## 2023-12-27 VITALS
SYSTOLIC BLOOD PRESSURE: 108 MMHG | HEIGHT: 72 IN | BODY MASS INDEX: 29.5 KG/M2 | WEIGHT: 217.81 LBS | RESPIRATION RATE: 14 BRPM | DIASTOLIC BLOOD PRESSURE: 72 MMHG | OXYGEN SATURATION: 96 % | TEMPERATURE: 97 F | HEART RATE: 88 BPM

## 2023-12-27 DIAGNOSIS — I10 PRIMARY HYPERTENSION: ICD-10-CM

## 2023-12-27 DIAGNOSIS — E78.00 PURE HYPERCHOLESTEROLEMIA: ICD-10-CM

## 2023-12-27 DIAGNOSIS — E11.65 TYPE 2 DIABETES MELLITUS WITH HYPERGLYCEMIA, WITHOUT LONG-TERM CURRENT USE OF INSULIN (HCC): Chronic | ICD-10-CM

## 2023-12-27 DIAGNOSIS — R05.2 SUBACUTE COUGH: ICD-10-CM

## 2023-12-27 PROCEDURE — 3008F BODY MASS INDEX DOCD: CPT | Performed by: INTERNAL MEDICINE

## 2023-12-27 PROCEDURE — 99214 OFFICE O/P EST MOD 30 MIN: CPT | Performed by: INTERNAL MEDICINE

## 2023-12-27 PROCEDURE — 3074F SYST BP LT 130 MM HG: CPT | Performed by: INTERNAL MEDICINE

## 2023-12-27 PROCEDURE — 3078F DIAST BP <80 MM HG: CPT | Performed by: INTERNAL MEDICINE

## 2023-12-27 PROCEDURE — 1159F MED LIST DOCD IN RCRD: CPT | Performed by: INTERNAL MEDICINE

## 2023-12-27 RX ORDER — BENZONATATE 200 MG/1
200 CAPSULE ORAL 3 TIMES DAILY PRN
Qty: 30 CAPSULE | Refills: 0 | Status: SHIPPED | OUTPATIENT
Start: 2023-12-27

## 2024-01-14 DIAGNOSIS — E78.00 PURE HYPERCHOLESTEROLEMIA: Primary | ICD-10-CM

## 2024-01-14 DIAGNOSIS — I10 PRIMARY HYPERTENSION: ICD-10-CM

## 2024-01-18 RX ORDER — ROSUVASTATIN CALCIUM 20 MG/1
20 TABLET, COATED ORAL NIGHTLY
Qty: 90 TABLET | Refills: 1 | Status: SHIPPED | OUTPATIENT
Start: 2024-01-18

## 2024-01-18 RX ORDER — LISINOPRIL 10 MG/1
10 TABLET ORAL DAILY
Qty: 90 TABLET | Refills: 1 | Status: SHIPPED | OUTPATIENT
Start: 2024-01-18

## 2024-04-16 ENCOUNTER — TELEPHONE (OUTPATIENT)
Dept: INTERNAL MEDICINE CLINIC | Facility: CLINIC | Age: 69
End: 2024-04-16

## 2024-04-19 LAB
CREATININE, RANDOM URINE: 82 MG/DL (ref 20–320)
HEMOGLOBIN A1C: 7.3 % OF TOTAL HGB
MICROALBUMIN: <0.2 MG/DL

## 2024-04-22 DIAGNOSIS — E11.65 TYPE 2 DIABETES MELLITUS WITH HYPERGLYCEMIA, WITHOUT LONG-TERM CURRENT USE OF INSULIN (HCC): Chronic | ICD-10-CM

## 2024-04-22 RX ORDER — METFORMIN HYDROCHLORIDE 500 MG/1
1000 TABLET, EXTENDED RELEASE ORAL 2 TIMES DAILY WITH MEALS
Qty: 360 TABLET | Refills: 0 | Status: CANCELLED | OUTPATIENT
Start: 2024-04-22

## 2024-04-22 NOTE — PROGRESS NOTES
Subjective:   Chai Marquez is a 69 year old male who presents for a MA (Medicare Advantage) Supervisit (Once per calendar year) and med check.     Colonoscopy not done. Fit done 4/2023, due now. Fit card done today.   Psa done 9/2023. Prostate exam done today.     Up to date tdap,rsv, prevnar 13, pneumovax.   Due shingrix.   Due covid booster (last 11/2023).   Get these at his local pharmacy.     [facility] flowsheet reviewed.   No falls.   Memory testing normal.   Mood has been stable. No depression.   Seeing eye doctor yearly.      Has had right arm tingling at times. Off and on. Feels it when he works with his hands. In his index, middle and ring finger. Lasts for about 30 secs and passes when he stretches it out.   Also with some tingling in the right upper arm with certain positions. Goes away in a few seconds.     History/Other:   Fall Risk Assessment:   He has been screened for Falls and is low risk.      Cognitive Assessment:   He had a completely normal cognitive assessment - see flowsheet entries       Functional Ability/Status:   Chai Marquez has a completely normal functional assessment. See flowsheet for details.      Depression Screening (PHQ-2/PHQ-9): PHQ-2 SCORE: 0  , done 4/22/2024   Last [facility] Suicide Screening on 4/23/2024 was No Risk.       Advanced Directives:   He does NOT have a Living Will. [Do you have a living will?: No]  He does NOT have a Power of  for Health Care. [Do you have a healthcare power of ?: No]  Discussed with patient and provided information.        Patient Active Problem List   Diagnosis    HTN (hypertension)    Hyperlipidemia associated with type 2 diabetes mellitus (HCC)    Type 2 diabetes mellitus with hyperglycemia, without long-term current use of insulin (HCC)     Allergies:  He has No Known Allergies.    Current Medications:  Outpatient Medications Marked as Taking for the 4/23/24 encounter (Office Visit) with Shirley Nichols MD   Medication Sig     Empagliflozin (JARDIANCE) 25 MG Oral Tab Take 25 mg by mouth daily.    metFORMIN  MG Oral Tablet 24 Hr Take 2 tablets (1,000 mg total) by mouth 2 (two) times daily with meals.    rosuvastatin 20 MG Oral Tab TAKE 1 TABLET BY MOUTH EVERY DAY AT NIGHT    lisinopril 10 MG Oral Tab TAKE 1 TABLET BY MOUTH EVERY DAY    ONETOUCH VERIO In Vitro Strip 1 LANCET BY FINGER STICK ROUTE 2 (TWO) TIMES DAILY.**E11.65    aspirin 81 MG Oral Tab Take 1 tablet (81 mg total) by mouth daily.    ibuprofen (MOTRIN) 200 MG Oral Tab Take 2 tablets (400 mg total) by mouth daily.       Medical History:  He  has a past medical history of Allergic rhinitis, Arthritis, History of thumb fracture (1980), HTN (hypertension) (5/12/2014), Hyperlipidemia (5/12/2014), Shingles (1994), Type II or unspecified type diabetes mellitus without mention of complication, not stated as uncontrolled, and Unspecified essential hypertension.  Surgical History:  He  has a past surgical history that includes thyroidectomy (1985) and other surgical history (1984).   Family History:  His family history includes Alcohol and Other Disorders Associated (age of onset: 50) in his father; Heart Attack (age of onset: 73) in his mother.  Social History:  He  reports that he has never smoked. He has never used smokeless tobacco. He reports that he does not currently use alcohol. He reports that he does not use drugs.    Tobacco:  He has never smoked tobacco.    CAGE Alcohol Screen:   He has been screened for alcohol abuse and his score is not 0:  Cut: Have you ever felt you should Cut down on your drinking?: Yes  Annoyed: Have people Annoyed you by criticizing your drinking?: No  Guilty: Have you ever felt bad or Guilty about your drinking?: No  Eye Opener: Have you ever had a drink first thing in the morning to steady your nerves or to get rid of a hangover (Eye opener)?: No  Total Score: 1      Patient Care Team:  Shirley Nichols MD as PCP - General  Carmen Hammonds MD  (OPHTHALMOLOGY)  Fantasma Hammonds MD (OPHTHALMOLOGY)    Review of Systems  GENERAL: feels well otherwise  SKIN: denies any unusual skin lesions  EYES:denies blurred vision or double vision  HEENT: denies nasal congestion, sinus pain or ST  LUNGS: denies shortness of breath with exertion  CARDIOVASCULAR: denies chest pain on exertion  GI: denies abdominal pain,denies heartburn  : denies dysuria  MUSCULOSKELETAL: denies back pain  NEURO: denies headaches  PSYCHE: denies depression or anxiety  HEMATOLOGIC: denies hx of anemia  ENDOCRINE: denies thyroid history  ALL/ASTHMA: denies hx of allergy or asthma     Objective:   Physical Exam  General Appearance:  Alert, cooperative, no distress, appears stated age   Head:  Normocephalic, without obvious abnormality, atraumatic   Eyes:  PERRL, conjunctiva/corneas clear, EOM's intact, both eyes   Ears:  Normal TM's and external ear canals, both ears   Nose: Nares normal, septum midline, mucosa normal, no drainage or sinus tenderness   Throat: Lips, mucosa, and tongue normal; teeth and gums normal   Neck: Supple, symmetrical, trachea midline, no adenopathy, thyroid: not enlarged,  no carotid bruit or JVD   Back:   Symmetric, no curvature, ROM normal, no CVA tenderness   Lungs:   Clear to auscultation bilaterally, respirations unlabored   Chest Wall:  No tenderness or deformity   Heart:  Regular rate and rhythm, S1, S2 normal, no murmur, rub or gallop   Abdomen:   Soft, non-tender, bowel sounds active all four quadrants,  no masses, no organomegaly   Genitalia: Normal male, no hernia   Rectal: Normal tone, normal prostate, no masses or tenderness   Extremities: Extremities normal, atraumatic, no cyanosis or edema   Pulses: 2+ and symmetric   Skin: Skin color, texture, turgor normal, no rashes or lesions   Lymph nodes: Cervical, supraclavicular, and axillary nodes normal   Neurologic: Normal   Bilateral barefoot skin diabetic exam is abnormal with pulsation pedal pulse exam  abnormal      /80 (BP Location: Left arm, Patient Position: Sitting, Cuff Size: adult)   Pulse 80   Temp 97.3 °F (36.3 °C) (Temporal)   Resp 16   Ht 6' 0.75\" (1.848 m)   Wt 212 lb 9.6 oz (96.4 kg)   BMI 28.24 kg/m²  Estimated body mass index is 28.24 kg/m² as calculated from the following:    Height as of this encounter: 6' 0.75\" (1.848 m).    Weight as of this encounter: 212 lb 9.6 oz (96.4 kg).    Medicare Hearing Assessment:   Hearing Screening    Screening Method: Finger Rub  Finger Rub Result: Pass               Assessment & Plan:   Chai Marquez is a 69 year old male who presents for a Medicare Assessment.     1. Encounter for annual health examination  Colonoscopy not done. Fit done 4/2023, due now. Fit card done today.   Psa done 9/2023. Prostate exam done today.     Up to date tdap,rsv, prevnar 13, pneumovax.   Due shingrix.   Due covid booster (last 11/2023).   Get these at his local pharmacy.     [facility] flowsheet reviewed.   No falls.   Memory testing normal.   Mood has been stable. No depression.   Seeing eye doctor yearly.      2. Primary hypertension  Continue meds.     3. Hyperlipidemia associated with type 2 diabetes mellitus (HCC)  Continue statin.   - CMP in 6 months  - Lipid in 6 months    4. Type 2 diabetes mellitus with hyperglycemia, without long-term current use of insulin (HCC)  Continue meds.   A1c 7.2. work on diet and exercise.   Foot exam done.   Eye exam done.   - Empagliflozin (JARDIANCE) 25 MG Oral Tab; Take 25 mg by mouth daily.  Dispense: 90 tablet; Refill: 1  - metFORMIN  MG Oral Tablet 24 Hr; Take 2 tablets (1,000 mg total) by mouth 2 (two) times daily with meals.  Dispense: 360 tablet; Refill: 1  - Hemoglobin A1C in 6 months    5. Diminished pulses in lower extremity  - US ANKLE BRACHIAL INDEX (CPT=93922); Future    6. Paresthesias  Likely has some carpal tunnel and some cervical radiculopathy.   Use a wrist splint for the carpal tunnel.   Monitor and rtc if this  is affecting him daily, we can consider PT.       The patient indicates understanding of these issues and agrees to the plan.  Reinforced healthy diet, lifestyle, and exercise.      Return in about 6 months (around 10/23/2024) for med check.     Shirley Nichols MD, 4/21/2024     Supplementary Documentation:   General Health:  In the past six months, have you lost more than 10 pounds without trying?: 1 - Yes  Has your appetite been poor?: No  Type of Diet: Balanced  How does the patient maintain a good energy level?: Appropriate Exercise  How would you describe your daily physical activity?: Moderate  How would you describe your current health state?: Good  How do you maintain positive mental well-being?: Social Interaction  On a scale of 0 to 10, with 0 being no pain and 10 being severe pain, what is your pain level?: 3 - (Mild) (right elbow)  In the past six months, have you experienced urine leakage?: 0-No  At any time do you feel concerned for the safety/well-being of yourself and/or your children, in your home or elsewhere?: No  Have you had any immunizations at another office such as Influenza, Hepatitis B, Tetanus, or Pneumococcal?: No        Chai Marquez's SCREENING SCHEDULE   Tests on this list are recommended by your physician but may not be covered, or covered at this frequency, by your insurer.   Please check with your insurance carrier before scheduling to verify coverage.   PREVENTATIVE SERVICES FREQUENCY &  COVERAGE DETAILS LAST COMPLETION DATE   Diabetes Screening    Fasting Blood Sugar / Glucose    One screening every 12 months if never tested or if previously tested but not diagnosed with pre-diabetes   One screening every 6 months if diagnosed with pre-diabetes Lab Results   Component Value Date     (H) 12/22/2023        Cardiovascular Disease Screening    Lipid Panel  Cholesterol  Lipoprotein (HDL)  Triglycerides Covered every 5 years for all Medicare beneficiaries without apparent signs or  symptoms of cardiovascular disease Lab Results   Component Value Date    CHOLEST 130 12/22/2023    HDL 37 (L) 12/22/2023    LDL 71 12/22/2023    LDLD 79 04/11/2023    TRIG 132 12/22/2023         Electrocardiogram (EKG)   Covered if needed at Welcome to Medicare, and non-screening if indicated for medical reasons -      Ultrasound Screening for Abdominal Aortic Aneurysm (AAA) Covered once in a lifetime for one of the following risk factors    Men who are 65-75 years old and have ever smoked    Anyone with a family history -     Colorectal Cancer Screening  Covered for ages 50-85; only need ONE of the following:    Colonoscopy   Covered every 10 years    Covered every 2 years if patient is at high risk or previous colonoscopy was abnormal -    Health Maintenance   Topic Date Due    Colorectal Cancer Screening  04/13/2024       Flexible Sigmoidoscopy   Covered every 4 years -    Fecal Occult Blood Test Covered annually -   Prostate Cancer Screening    Prostate-Specific Antigen (PSA) Annually No results found for: \"PSA\"  Health Maintenance   Topic Date Due    PSA  09/01/2025      Immunizations    Influenza Covered once per flu season  Please get every year 11/15/2023  No recommendations at this time    Pneumococcal Each vaccine (Jrbxaix37 & Yszlxgtio70) covered once after 65 Prevnar 13: 01/29/2019    [id number]: 01/13/2020     No recommendations at this time    Hepatitis B One screening covered for patients with certain risk factors   -  No recommendations at this time    Tetanus Toxoid Not covered by Medicare Part B unless medically necessary (cut with metal); may be covered with your pharmacy prescription benefits -    Tetanus, Diptheria and Pertusis TD and TDaP Not covered by Medicare Part B -  No recommendations at this time    Zoster Not covered by Medicare Part B; may be covered with your pharmacy  prescription benefits 08/03/2015  Zoster Vaccines(2 of 3) due on 09/28/2015     Diabetes      Hemoglobin A1C  Annually; if last result is elevated, may be asked to retest more frequently.    Medicare covers every 3 months Lab Results   Component Value Date    A1C 7.3 (H) 04/18/2024       No recommendations at this time    Creat/alb ratio Annually Lab Results   Component Value Date    MICROALBCREA 4.8 08/09/2022       LDL Annually Lab Results   Component Value Date    LDL 71 12/22/2023       Dilated Eye Exam Annually Last Diabetic Eye Exam:  Last Dilated Eye Exam: 04/15/24  Eye Exam shows Diabetic Retinopathy?: No       Annual Monitoring of Persistent Medications (ACE/ARB, digoxin diuretics, anticonvulsants)    Potassium Annually Lab Results   Component Value Date    K 4.2 12/22/2023         Creatinine   Annually Lab Results   Component Value Date    CREATSERUM 1.01 12/22/2023         BUN Annually Lab Results   Component Value Date    BUN 22 12/22/2023       Drug Serum Conc Annually No results found for: \"DIGOXIN\", \"DIG\", \"VALP\"

## 2024-04-23 ENCOUNTER — OFFICE VISIT (OUTPATIENT)
Dept: INTERNAL MEDICINE CLINIC | Facility: CLINIC | Age: 69
End: 2024-04-23
Payer: COMMERCIAL

## 2024-04-23 VITALS
HEART RATE: 80 BPM | WEIGHT: 212.63 LBS | RESPIRATION RATE: 16 BRPM | HEIGHT: 72.75 IN | TEMPERATURE: 97 F | DIASTOLIC BLOOD PRESSURE: 80 MMHG | BODY MASS INDEX: 28.18 KG/M2 | SYSTOLIC BLOOD PRESSURE: 120 MMHG

## 2024-04-23 DIAGNOSIS — Z00.00 ENCOUNTER FOR ANNUAL HEALTH EXAMINATION: Primary | ICD-10-CM

## 2024-04-23 DIAGNOSIS — I10 PRIMARY HYPERTENSION: ICD-10-CM

## 2024-04-23 DIAGNOSIS — E11.69 HYPERLIPIDEMIA ASSOCIATED WITH TYPE 2 DIABETES MELLITUS (HCC): ICD-10-CM

## 2024-04-23 DIAGNOSIS — E78.5 HYPERLIPIDEMIA ASSOCIATED WITH TYPE 2 DIABETES MELLITUS (HCC): ICD-10-CM

## 2024-04-23 DIAGNOSIS — R09.89 DIMINISHED PULSES IN LOWER EXTREMITY: ICD-10-CM

## 2024-04-23 DIAGNOSIS — R20.2 PARESTHESIAS: ICD-10-CM

## 2024-04-23 DIAGNOSIS — Z12.11 ENCOUNTER FOR SCREENING FECAL OCCULT BLOOD TESTING: ICD-10-CM

## 2024-04-23 DIAGNOSIS — E11.65 TYPE 2 DIABETES MELLITUS WITH HYPERGLYCEMIA, WITHOUT LONG-TERM CURRENT USE OF INSULIN (HCC): Chronic | ICD-10-CM

## 2024-04-23 PROCEDURE — 82272 OCCULT BLD FECES 1-3 TESTS: CPT | Performed by: INTERNAL MEDICINE

## 2024-04-23 RX ORDER — METFORMIN HYDROCHLORIDE 500 MG/1
1000 TABLET, EXTENDED RELEASE ORAL 2 TIMES DAILY WITH MEALS
Qty: 360 TABLET | Refills: 1 | Status: SHIPPED | OUTPATIENT
Start: 2024-04-23

## 2024-04-23 RX ORDER — EMPAGLIFLOZIN 25 MG/1
25 TABLET, FILM COATED ORAL DAILY
Qty: 90 TABLET | Refills: 1 | Status: SHIPPED | OUTPATIENT
Start: 2024-04-23

## 2024-04-23 NOTE — PATIENT INSTRUCTIONS
Chai Marquez's SCREENING SCHEDULE   Tests on this list are recommended by your physician but may not be covered, or covered at this frequency, by your insurer.   Please check with your insurance carrier before scheduling to verify coverage.   PREVENTATIVE SERVICES FREQUENCY &  COVERAGE DETAILS LAST COMPLETION DATE   Diabetes Screening    Fasting Blood Sugar / Glucose    One screening every 12 months if never tested or if previously tested but not diagnosed with pre-diabetes   One screening every 6 months if diagnosed with pre-diabetes Lab Results   Component Value Date     (H) 12/22/2023        Cardiovascular Disease Screening    Lipid Panel  Cholesterol  Lipoprotein (HDL)  Triglycerides Covered every 5 years for all Medicare beneficiaries without apparent signs or symptoms of cardiovascular disease Lab Results   Component Value Date    CHOLEST 130 12/22/2023    HDL 37 (L) 12/22/2023    LDL 71 12/22/2023    LDLD 79 04/11/2023    TRIG 132 12/22/2023         Electrocardiogram (EKG)   Covered if needed at Welcome to Medicare, and non-screening if indicated for medical reasons -      Ultrasound Screening for Abdominal Aortic Aneurysm (AAA) Covered once in a lifetime for one of the following risk factors   • Men who are 65-75 years old and have ever smoked   • Anyone with a family history -     Colorectal Cancer Screening  Covered for ages 50-85; only need ONE of the following:    Colonoscopy   Covered every 10 years    Covered every 2 years if patient is at high risk or previous colonoscopy was abnormal -    Health Maintenance   Topic Date Due   • Colorectal Cancer Screening  04/13/2024       Flexible Sigmoidoscopy   Covered every 4 years -    Fecal Occult Blood Test Covered annually -   Prostate Cancer Screening    Prostate-Specific Antigen (PSA) Annually No results found for: \"PSA\"  Health Maintenance   Topic Date Due   • PSA  09/01/2025      Immunizations    Influenza Covered once per flu season  Please get  every year 11/15/2023  No recommendations at this time    Pneumococcal Each vaccine (Swrefis33 & Wwawttxao34) covered once after 65 Prevnar 13: 01/29/2019    Ubkaipobr33: 01/13/2020     No recommendations at this time    Hepatitis B One screening covered for patients with certain risk factors   -  No recommendations at this time    Tetanus Toxoid Not covered by Medicare Part B unless medically necessary (cut with metal); may be covered with your pharmacy prescription benefits -    Tetanus, Diptheria and Pertusis TD and TDaP Not covered by Medicare Part B -  No recommendations at this time    Zoster Not covered by Medicare Part B; may be covered with your pharmacy  prescription benefits 08/03/2015  Zoster Vaccines(2 of 3) due on 09/28/2015     Diabetes      Hemoglobin A1C Annually; if last result is elevated, may be asked to retest more frequently.    Medicare covers every 3 months Lab Results   Component Value Date    A1C 7.3 (H) 04/18/2024       No recommendations at this time    Creat/alb ratio Annually Lab Results   Component Value Date    MICROALBCREA 4.8 08/09/2022       LDL Annually Lab Results   Component Value Date    LDL 71 12/22/2023       Dilated Eye Exam Annually [unfilled]     Annual Monitoring of Persistent Medications (ACE/ARB, digoxin diuretics, anticonvulsants)    Potassium Annually Lab Results   Component Value Date    K 4.2 12/22/2023         Creatinine   Annually Lab Results   Component Value Date    CREATSERUM 1.01 12/22/2023         BUN Annually Lab Results   Component Value Date    BUN 22 12/22/2023       Drug Serum Conc Annually No results found for: \"DIGOXIN\", \"DIG\", \"VALP\"

## 2024-07-17 DIAGNOSIS — E78.00 PURE HYPERCHOLESTEROLEMIA: ICD-10-CM

## 2024-07-17 DIAGNOSIS — I10 PRIMARY HYPERTENSION: ICD-10-CM

## 2024-07-17 RX ORDER — LISINOPRIL 10 MG/1
10 TABLET ORAL DAILY
Qty: 90 TABLET | Refills: 0 | Status: SHIPPED | OUTPATIENT
Start: 2024-07-17

## 2024-07-17 RX ORDER — ROSUVASTATIN CALCIUM 20 MG/1
20 TABLET, COATED ORAL NIGHTLY
Qty: 90 TABLET | Refills: 0 | Status: SHIPPED | OUTPATIENT
Start: 2024-07-17

## 2024-08-02 ENCOUNTER — PATIENT OUTREACH (OUTPATIENT)
Dept: INTERNAL MEDICINE CLINIC | Facility: CLINIC | Age: 69
End: 2024-08-02

## 2024-10-25 DIAGNOSIS — I10 PRIMARY HYPERTENSION: ICD-10-CM

## 2024-10-25 DIAGNOSIS — E11.65 TYPE 2 DIABETES MELLITUS WITH HYPERGLYCEMIA, WITHOUT LONG-TERM CURRENT USE OF INSULIN (HCC): Chronic | ICD-10-CM

## 2024-10-25 DIAGNOSIS — E78.00 PURE HYPERCHOLESTEROLEMIA: ICD-10-CM

## 2024-10-28 RX ORDER — ROSUVASTATIN CALCIUM 20 MG/1
20 TABLET, COATED ORAL NIGHTLY
Qty: 90 TABLET | Refills: 0 | Status: SHIPPED | OUTPATIENT
Start: 2024-10-28

## 2024-10-28 RX ORDER — EMPAGLIFLOZIN 25 MG/1
25 TABLET, FILM COATED ORAL DAILY
Qty: 90 TABLET | Refills: 0 | Status: SHIPPED | OUTPATIENT
Start: 2024-10-28

## 2024-10-28 RX ORDER — LISINOPRIL 10 MG/1
10 TABLET ORAL DAILY
Qty: 90 TABLET | Refills: 0 | Status: SHIPPED | OUTPATIENT
Start: 2024-10-28

## 2024-10-28 NOTE — TELEPHONE ENCOUNTER
Last OV relevant to medication: 4/23/24  Last refill date: 4/23/24 #90/refills: 1  When pt was asked to return for OV: 10/23/24  Upcoming appt/reason:   Future Appointments   Date Time Provider Department Center   10/30/2024 10:40 AM Criselda Jolley APRN [facility]   5/20/2025 12:40 PM Shirley Nichols MD [facility]   Was pt informed of any over due labs: utd  Lab Results   Component Value Date    A1C 7.3 (H) 04/18/2024       Hypertension Medications Protocol Vfbtnb82/25/2024 01:08 PM   Protocol Details CMP or BMP in past 12 months    Last BP reading less than 140/90    In person appointment or virtual visit in the past 12 mos or appointment in next 3 mos    EGFRCR or GFRNAA > 50      2. Primary hypertension  Continue meds.  Cholesterol Medication Protocol Vdkmce57/25/2024 01:08 PM   Protocol Details ALT < 80    ALT resulted within past year    Lipid panel within past 12 months    In person appointment or virtual visit in the past 12 mos or appointment in next 3 mos   3. Hyperlipidemia associated with type 2 diabetes mellitus (HCC)  Continue statin

## 2024-10-29 LAB
ALBUMIN/GLOBULIN RATIO: 1.7 (CALC) (ref 1–2.5)
ALBUMIN: 4 G/DL (ref 3.6–5.1)
ALKALINE PHOSPHATASE: 57 U/L (ref 35–144)
ALT: 11 U/L (ref 9–46)
AST: 12 U/L (ref 10–35)
BILIRUBIN, TOTAL: 0.4 MG/DL (ref 0.2–1.2)
BUN: 21 MG/DL (ref 7–25)
CALCIUM: 9.1 MG/DL (ref 8.6–10.3)
CARBON DIOXIDE: 25 MMOL/L (ref 20–32)
CHLORIDE: 103 MMOL/L (ref 98–110)
CHOL/HDLC RATIO: 3.1 (CALC)
CHOLESTEROL, TOTAL: 146 MG/DL
CREATININE: 1.04 MG/DL (ref 0.7–1.28)
EGFR: 77 ML/MIN/1.73M2
GLOBULIN: 2.4 G/DL (CALC) (ref 1.9–3.7)
GLUCOSE: 120 MG/DL (ref 65–99)
HDL CHOLESTEROL: 47 MG/DL
HEMOGLOBIN A1C: 7.3 % OF TOTAL HGB
LDL-CHOLESTEROL: 79 MG/DL (CALC)
NON-HDL CHOLESTEROL: 99 MG/DL (CALC)
POTASSIUM: 4.4 MMOL/L (ref 3.5–5.3)
PROTEIN, TOTAL: 6.4 G/DL (ref 6.1–8.1)
SODIUM: 140 MMOL/L (ref 135–146)
TRIGLYCERIDES: 115 MG/DL

## 2024-10-30 ENCOUNTER — OFFICE VISIT (OUTPATIENT)
Dept: INTERNAL MEDICINE CLINIC | Facility: CLINIC | Age: 69
End: 2024-10-30
Payer: COMMERCIAL

## 2024-10-30 VITALS
SYSTOLIC BLOOD PRESSURE: 110 MMHG | TEMPERATURE: 98 F | OXYGEN SATURATION: 98 % | BODY MASS INDEX: 27.79 KG/M2 | WEIGHT: 209.69 LBS | HEART RATE: 78 BPM | HEIGHT: 72.75 IN | DIASTOLIC BLOOD PRESSURE: 68 MMHG | RESPIRATION RATE: 16 BRPM

## 2024-10-30 DIAGNOSIS — Z23 NEED FOR VACCINATION: ICD-10-CM

## 2024-10-30 DIAGNOSIS — E11.69 HYPERLIPIDEMIA ASSOCIATED WITH TYPE 2 DIABETES MELLITUS (HCC): ICD-10-CM

## 2024-10-30 DIAGNOSIS — E78.5 HYPERLIPIDEMIA ASSOCIATED WITH TYPE 2 DIABETES MELLITUS (HCC): ICD-10-CM

## 2024-10-30 DIAGNOSIS — I10 PRIMARY HYPERTENSION: ICD-10-CM

## 2024-10-30 DIAGNOSIS — E11.65 TYPE 2 DIABETES MELLITUS WITH HYPERGLYCEMIA, WITHOUT LONG-TERM CURRENT USE OF INSULIN (HCC): Primary | Chronic | ICD-10-CM

## 2024-10-30 DIAGNOSIS — Z12.5 ENCOUNTER FOR PROSTATE CANCER SCREENING: ICD-10-CM

## 2024-10-30 PROCEDURE — 3078F DIAST BP <80 MM HG: CPT | Performed by: NURSE PRACTITIONER

## 2024-10-30 PROCEDURE — 90662 IIV NO PRSV INCREASED AG IM: CPT | Performed by: NURSE PRACTITIONER

## 2024-10-30 PROCEDURE — G0008 ADMIN INFLUENZA VIRUS VAC: HCPCS | Performed by: NURSE PRACTITIONER

## 2024-10-30 PROCEDURE — 3074F SYST BP LT 130 MM HG: CPT | Performed by: NURSE PRACTITIONER

## 2024-10-30 PROCEDURE — 3051F HG A1C>EQUAL 7.0%<8.0%: CPT | Performed by: NURSE PRACTITIONER

## 2024-10-30 PROCEDURE — 1159F MED LIST DOCD IN RCRD: CPT | Performed by: NURSE PRACTITIONER

## 2024-10-30 PROCEDURE — 3008F BODY MASS INDEX DOCD: CPT | Performed by: NURSE PRACTITIONER

## 2024-10-30 PROCEDURE — 3061F NEG MICROALBUMINURIA REV: CPT | Performed by: NURSE PRACTITIONER

## 2024-10-30 PROCEDURE — 99214 OFFICE O/P EST MOD 30 MIN: CPT | Performed by: NURSE PRACTITIONER

## 2024-10-30 PROCEDURE — 1170F FXNL STATUS ASSESSED: CPT | Performed by: NURSE PRACTITIONER

## 2024-10-30 NOTE — PROGRESS NOTES
CHIEF COMPLAINT:     Chief Complaint   Patient presents with    Medication Follow-Up     6 Month Follow Up       HPI:   Chai Marquez is a 69 year old male coming in for med check. Labs reviewed and discussed with patient.    DM: Denies excessive thirst or urination, neuropathy, hypoglycemia events. Taking medications as prescribed without side effects. A1C is stable at 7.3%. Eye exam UTD, urine for micro UTD, foot exam UTD. On a statin. Is doing good with low carb diet and exercise.     HTN: Patient takes the BP medications as directed, no reported side effects. Denies chest pain, SOB, palpitations, headaches, visual disturbances. Patient is on low salt diet, non-smoker.    Hyperlipidemia: On statin, tolerating well, reports no SE. No muscle aches.     Past Medical History:    Allergic rhinitis    Arthritis    History of thumb fracture    Right hand     HTN (hypertension)    Hyperlipidemia    Shingles    face    Type II or unspecified type diabetes mellitus without mention of complication, not stated as uncontrolled    Unspecified essential hypertension      Past Surgical History:   Procedure Laterality Date    Adenoidectomy      adenoid removal    Other surgical history      partial thyroid removal    Thyroidectomy      Partial Thyroid removed      Social History:  Social History     Socioeconomic History    Marital status:    Tobacco Use    Smoking status: Never     Passive exposure: Never    Smokeless tobacco: Never   Vaping Use    Vaping status: Never Used   Substance and Sexual Activity    Alcohol use: Not Currently    Drug use: No   Other Topics Concern    Caffeine Concern No    Stress Concern No    Weight Concern Yes    Special Diet No    Exercise Yes    Seat Belt Yes      Family History:  Family History   Problem Relation Age of Onset    Heart Attack Mother 73            Alcohol and Other Disorders Associated Father 50              Allergies:  Allergies[1]   Current  Meds:  Current Outpatient Medications   Medication Sig Dispense Refill    LISINOPRIL 10 MG Oral Tab TAKE 1 TABLET BY MOUTH EVERY DAY 90 tablet 0    JARDIANCE 25 MG Oral Tab TAKE 1 TABLET (25 MG) BY MOUTH DAILY. 90 tablet 0    ROSUVASTATIN 20 MG Oral Tab TAKE 1 TABLET BY MOUTH EVERY DAY AT NIGHT 90 tablet 0    metFORMIN  MG Oral Tablet 24 Hr Take 2 tablets (1,000 mg total) by mouth 2 (two) times daily with meals. (Patient taking differently: Take 1.5 tablets by mouth 2 (two) times daily with meals.) 360 tablet 1    ONETOUCH VERIO In Vitro Strip 1 LANCET BY FINGER STICK ROUTE 2 (TWO) TIMES DAILY.**E11.65 200 strip 0    aspirin 81 MG Oral Tab Take 1 tablet (81 mg total) by mouth daily.      ibuprofen (MOTRIN) 200 MG Oral Tab Take 2 tablets (400 mg total) by mouth daily.         Counseling given: Not Answered       REVIEW OF SYSTEMS:   See HPI.    EXAM:     /68 (BP Location: Left arm, Patient Position: Sitting, Cuff Size: adult)   Pulse 78   Temp 97.9 °F (36.6 °C) (Temporal)   Resp 16   Ht 6' 0.75\" (1.848 m)   Wt 209 lb 11.2 oz (95.1 kg)   SpO2 98%   BMI 27.86 kg/m²   Body mass index is 27.86 kg/m².   Vital signs reviewed. Appears stated age, well groomed, in no acute distress.  Physical Exam:  GENERAL: Patient is alert, awake and oriented, well developed, well nourished.  HEENT: Head: Normocephalic, atraumatic.  HEART: RRR without murmur.  LUNGS: Clear to auscultation bilaterally, no rales/rhonchi/wheezing.  ABDOMEN: good BS's, no masses, HSM or tenderness  MUSCULOSKELETAL: No obvious joint deformity or swelling.   EXTREMITIES: No edema, no cyanosis, no clubbing  NEURO: Oriented time three. .    LABS:      No results found for: \"WBC\", \"RBC\", \"HGB\", \"HCT\", \"MCV\", \"MCH\", \"MCHC\", \"RDW\", \"PLT\", \"MPV\"   Lab Results   Component Value Date     (H) 10/28/2024    BUN 21 10/28/2024    BUNCREA SEE NOTE: 10/28/2024    CREATSERUM 1.04 10/28/2024    GFRNAA 67 04/11/2023    GFRAA 95 01/11/2022    CA 9.1  10/28/2024    ALKPHO 57 10/28/2024    AST 12 10/28/2024    ALT 11 10/28/2024    BILT 0.4 10/28/2024    TP 6.4 10/28/2024    ALB 4.0 10/28/2024    GLOBULIN 2.4 10/28/2024    AGRATIO 1.7 10/28/2024     10/28/2024    K 4.4 10/28/2024     10/28/2024    CO2 25 10/28/2024      Lab Results   Component Value Date    CHOLEST 146 10/28/2024    TRIG 115 10/28/2024    HDL 47 10/28/2024    LDL 79 10/28/2024    TCHDLRATIO 3.1 10/28/2024    NONHDLC 99 10/28/2024      Lab Results   Component Value Date    T4F 1.1 07/15/2014    TSH 2.22 07/15/2014      Lab Results   Component Value Date    A1C 7.3 (H) 10/28/2024        IMAGING:     No results found.     ASSESSMENT AND PLAN:   1. Type 2 diabetes mellitus with hyperglycemia, without long-term current use of insulin (HCC)  -A1C 7.3%. Work on low carb diet and regular exercise  -Continue current management  -Foot exam UTD  -Eye exam UTD  -Urine for micro UTD  -On a statin  -Repeat labs in 6 months  - Hemoglobin A1C in 6 months  - Microalb/Creat Ratio, Random Urine in 6 months    2. Primary hypertension  -BP stable, CPM  - CMP in 6 months    3. Hyperlipidemia associated with type 2 diabetes mellitus (HCC)  -LDL at goal. Continue statin  - Lipid in 6 months    4. Encounter for prostate cancer screening  - PSA, TOTAL W REFLEX TO PSA, FREE [19104][Q]    5. Need for vaccination  -Flu vaccine given today  - INFLUENZA VAC HIGH DOSE PRSV FREE     The patient indicates understanding of these issues and agrees to the plan.  Return for as scheduled or sooner as needed.    Criselda Jolley, APRN  10/30/2024         [1] No Known Allergies

## 2025-01-21 DIAGNOSIS — E78.00 PURE HYPERCHOLESTEROLEMIA: ICD-10-CM

## 2025-01-21 DIAGNOSIS — I10 PRIMARY HYPERTENSION: ICD-10-CM

## 2025-01-21 DIAGNOSIS — E11.65 TYPE 2 DIABETES MELLITUS WITH HYPERGLYCEMIA, WITHOUT LONG-TERM CURRENT USE OF INSULIN (HCC): Chronic | ICD-10-CM

## 2025-01-21 RX ORDER — ROSUVASTATIN CALCIUM 20 MG/1
20 TABLET, COATED ORAL NIGHTLY
Qty: 90 TABLET | Refills: 0 | Status: SHIPPED | OUTPATIENT
Start: 2025-01-21

## 2025-01-21 RX ORDER — METFORMIN HYDROCHLORIDE 500 MG/1
1000 TABLET, EXTENDED RELEASE ORAL 2 TIMES DAILY WITH MEALS
Qty: 360 TABLET | Refills: 1 | Status: SHIPPED | OUTPATIENT
Start: 2025-01-21

## 2025-01-21 RX ORDER — EMPAGLIFLOZIN 25 MG/1
25 TABLET, FILM COATED ORAL DAILY
Qty: 90 TABLET | Refills: 1 | Status: SHIPPED | OUTPATIENT
Start: 2025-01-21

## 2025-01-21 RX ORDER — LISINOPRIL 10 MG/1
10 TABLET ORAL DAILY
Qty: 90 TABLET | Refills: 0 | Status: SHIPPED | OUTPATIENT
Start: 2025-01-21

## 2025-01-21 NOTE — TELEPHONE ENCOUNTER
Last office visit: 10/30/2024   Protocol: pass  Requested medication(s) are due for refill today: yes  Requested medication(s) are on the active medication list same strength, form, dose/ sig: yes  Requested medication(s) are managed by provider: yes  Patient has already received a courtsey refill: no      NOV: 5/20/2025  Last Labs: 10/28/2024  Asked to Return: as needed

## 2025-04-25 DIAGNOSIS — E78.00 PURE HYPERCHOLESTEROLEMIA: ICD-10-CM

## 2025-04-25 DIAGNOSIS — I10 PRIMARY HYPERTENSION: ICD-10-CM

## 2025-04-25 RX ORDER — LISINOPRIL 10 MG/1
10 TABLET ORAL DAILY
Qty: 90 TABLET | Refills: 0 | Status: SHIPPED | OUTPATIENT
Start: 2025-04-25

## 2025-04-25 RX ORDER — ROSUVASTATIN CALCIUM 20 MG/1
20 TABLET, COATED ORAL NIGHTLY
Qty: 90 TABLET | Refills: 0 | Status: SHIPPED | OUTPATIENT
Start: 2025-04-25

## 2025-04-25 NOTE — TELEPHONE ENCOUNTER
Cholesterol Medication Protocol [id number]/25/2025 08:26 AM   Protocol Details ALT < 80    ALT resulted within past year    Lipid panel within past 12 months    In person appointment or virtual visit in the past 12 mos or appointment in next 3 mos    Medication is active on med list       Hyperlipidemia associated with type 2 diabetes mellitus (HCC)  -LDL at goal. Continue statin    Hypertension Medications Protocol Seohca6404/25/2025 08:26 AM   Protocol Details CMP or BMP in past 12 months    Last BP reading less than 140/90    In person appointment or virtual visit in the past 12 mos or appointment in next 3 mos    EGFRCR or GFRNAA > 50    Medication is active on med list       Primary hypertension  -BP stable, CPM  - CMP in 6 months     Future Appointments   Date Time Provider Department Center   5/20/2025 12:40 PM Shirley Nichols MD [facility]

## 2025-05-17 LAB
ALBUMIN/GLOBULIN RATIO: 1.8 (CALC) (ref 1–2.5)
ALBUMIN: 4.2 G/DL (ref 3.6–5.1)
ALKALINE PHOSPHATASE: 60 U/L (ref 35–144)
ALT: 10 U/L (ref 9–46)
AST: 11 U/L (ref 10–35)
BILIRUBIN, TOTAL: 0.4 MG/DL (ref 0.2–1.2)
BUN: 25 MG/DL (ref 7–25)
CALCIUM: 9.2 MG/DL (ref 8.6–10.3)
CARBON DIOXIDE: 25 MMOL/L (ref 20–32)
CHLORIDE: 107 MMOL/L (ref 98–110)
CHOL/HDLC RATIO: 3.1 (CALC)
CHOLESTEROL, TOTAL: 143 MG/DL
CREATININE, RANDOM URINE: 80 MG/DL (ref 20–320)
CREATININE: 1.05 MG/DL (ref 0.7–1.28)
EGFR: 75 ML/MIN/1.73M2
GLOBULIN: 2.4 G/DL (CALC) (ref 1.9–3.7)
GLUCOSE: 130 MG/DL (ref 65–99)
HDL CHOLESTEROL: 46 MG/DL
HEMOGLOBIN A1C: 7.4 %
LDL-CHOLESTEROL: 77 MG/DL (CALC)
MICROALBUMIN/CREATININE RATIO, RANDOM URINE: 6 MG/G CREAT
MICROALBUMIN: 0.5 MG/DL
NON-HDL CHOLESTEROL: 97 MG/DL (CALC)
POTASSIUM: 4.4 MMOL/L (ref 3.5–5.3)
PROTEIN, TOTAL: 6.6 G/DL (ref 6.1–8.1)
SODIUM: 140 MMOL/L (ref 135–146)
TRIGLYCERIDES: 122 MG/DL

## 2025-05-18 NOTE — PROGRESS NOTES
Subjective:   Chai Marquez is a 70 year old male who presents for a MA AHA (Medicare Advantage Annual Health Assessment) and Subsequent Annual Wellness visit (Pt already had Initial Annual Wellness) and scheduled follow up of multiple significant but stable problems.          The following individual(s) verbally consented to be recorded using ambient AI listening technology and understand that they can each withdraw their consent to this listening technology at any point by asking the clinician to turn off or pause the recording:    Patient name: Chai Marquez  Additional names:  none     Colonoscopy not done. Fit done 4/2024, due now. Fit card done today.   Psa done 9/2023. Prostate exam done today.      Up to date tdap,rsv, covid booster.   Due shingrix. Get at his local pharmacy.   Due prevnar 20. Declined today.      AHA flowsheet reviewed.   No falls.   Memory testing normal.   Mood has been stable. No depression.   Seeing eye doctor yearly.      Tianna not done last year. Reminded to do.     History of Present Illness  He is concerned about elevated prostate enzyme levels. He engaged in sexual activity, specifically masturbation, three to four days prior to the test, which could have influenced the results. No urinary symptoms such as urgency or frequency are present.    He is due for colorectal cancer screening. He completed a stool card test last year.    His diabetes management shows a slight increase in A1c to 7.4 from 7.2, attributed to increased carbohydrate intake. He is on metformin 750 mg twice daily and Jardiance 25 mg daily. He experiences occasional tingling in his toes, associated with diabetic neuropathy. Eye exam is due. Foot exam also due.     He maintains good moderate physical activity and experiences mild shoulder cramps. No urinary leakage is reported, and he performs daily activities independently. Mental health is stable with no signs of depression, and memory is intact.    He has a  history of tick exposure. Found a couple on a day when he went hiking, he would like me to check him for ticks on his back and butt which are inaccessible to him.     History/Other:   Fall Risk Assessment:   He has been screened for Falls and is low risk.      Cognitive Assessment:   He had a completely normal cognitive assessment - see flowsheet entries       Functional Ability/Status:   Chai Marquez has a completely normal functional assessment. See flowsheet for details.      Depression Screening (PHQ):  PHQ-2 SCORE: 0  , done 5/20/2025   Last Thelma Suicide Screening on 5/20/2025 was No Risk.       Advanced Directives:   He does NOT have a Living Will. [Do you have a living will?: (Patient-Rptd) No]  He does NOT have a Power of  for Health Care. [Do you have a healthcare power of ?: No (in process)]  Discussed with patient and provided information.        Problem List[1]  Allergies:  He has no known allergies.    Current Medications:  Active Meds, Sig Only[2]    Medical History:  He  has a past medical history of Allergic rhinitis, Arthritis, History of thumb fracture (1980), HTN (hypertension) (5/12/2014), Hyperlipidemia (5/12/2014), Shingles (1994), Type II or unspecified type diabetes mellitus without mention of complication, not stated as uncontrolled, and Unspecified essential hypertension.  Surgical History:  He  has a past surgical history that includes thyroidectomy (1985); other surgical history (1984); adenoidectomy (1962); and other surgical history.   Family History:  His family history includes Alcohol and Other Disorders Associated (age of onset: 50) in his father; Heart Attack (age of onset: 73) in his mother.  Social History:  He  reports that he has never smoked. He has never been exposed to tobacco smoke. He has never used smokeless tobacco. He reports that he does not currently use alcohol. He reports that he does not use drugs.    Tobacco:  He has never smoked  tobacco.    CAGE Alcohol Screen:   He has been screened for alcohol abuse and his score is not 0:  Cut: Have you ever felt you should Cut down on your drinking?: (Patient-Rptd) Yes  Annoyed: Have people Annoyed you by criticizing your drinking?: (Patient-Rptd) No  Guilty: Have you ever felt bad or Guilty about your drinking?: (Patient-Rptd) No  Eye Opener: Have you ever had a drink first thing in the morning to steady your nerves or to get rid of a hangover (Eye opener)?: (Patient-Rptd) No  Total Score: (Patient-Rptd) 1      Patient Care Team:  Shirley Nichols MD as PCP - General  Carmen Hammonds MD (OPHTHALMOLOGY)  Fantasma Hammonds MD (OPHTHALMOLOGY)    Review of Systems  GENERAL: feels well otherwise  SKIN: denies any unusual skin lesions  EYES:denies blurred vision or double vision  HEENT: denies nasal congestion, sinus pain or ST  LUNGS: denies shortness of breath with exertion  CARDIOVASCULAR: denies chest pain on exertion  GI: denies abdominal pain,denies heartburn  : denies dysuria  MUSCULOSKELETAL: denies back pain  NEURO: denies headaches  PSYCHE: denies depression or anxiety  HEMATOLOGIC: denies hx of anemia  ENDOCRINE: denies thyroid history  ALL/ASTHMA: denies hx of allergy or asthma     Objective:   Physical Exam  General Appearance:  Alert, cooperative, no distress, appears stated age   Head:  Normocephalic, without obvious abnormality, atraumatic   Eyes:  PERRL, conjunctiva/corneas clear, EOM's intact, both eyes   Ears:  Normal TM's and external ear canals, both ears   Nose: Nares normal, septum midline, mucosa normal, no drainage or sinus tenderness   Throat: Lips, mucosa, and tongue normal; teeth and gums normal   Neck: Supple, symmetrical, trachea midline, no adenopathy, thyroid: not enlarged, no carotid bruit or JVD   Back:   Symmetric, no curvature, ROM normal, no CVA tenderness   Lungs:   Clear to auscultation bilaterally, respirations unlabored   Chest Wall:  No tenderness or deformity    Heart:  Regular rate and rhythm, S1, S2 normal, no murmur, rub or gallop   Abdomen:   Soft, non-tender, bowel sounds active all four quadrants,  no masses, no organomegaly   Genitalia: Deferred. No symptoms.    Rectal: Normal tone, normal prostate, no masses or tenderness   Extremities: Extremities normal, atraumatic, no cyanosis or edema   Pulses: 2+ and symmetric   Skin: Skin color, texture, turgor normal, no rashes or lesions. No ticks.    Lymph nodes: Cervical, supraclavicular, and axillary nodes normal   Neurologic: Normal   Left barefoot skin diabetic exam is abnormal with diabetic monofilament/sensation testing abnormal only on left big toe. Also diminished pulses bilaterally.     /72 (BP Location: Left arm, Patient Position: Sitting, Cuff Size: large)   Pulse 84   Temp 97 °F (36.1 °C) (Temporal)   Ht 6' 0.5\" (1.842 m)   Wt 206 lb 12.8 oz (93.8 kg)   BMI 27.66 kg/m²  Estimated body mass index is 27.66 kg/m² as calculated from the following:    Height as of this encounter: 6' 0.5\" (1.842 m).    Weight as of this encounter: 206 lb 12.8 oz (93.8 kg).    Medicare Hearing Assessment:   Hearing Screening    Screening Method: Finger Rub  Finger Rub Result: Pass               Assessment & Plan:   Chai Marquez is a 70 year old male who presents for a Medicare Assessment.     1. Encounter for annual health examination  Colonoscopy not done. Fit done 4/2024, due now. Fit card done today.   Psa done 9/2023. Prostate exam done today.      Up to date tdap,rsv, covid booster.   Due shingrix. Get at his local pharmacy.   Due prevnar 20. Declined today.      AHA flowsheet reviewed.   No falls.   Memory testing normal.   Mood has been stable. No depression.   Seeing eye doctor yearly.      2. Primary hypertension  Continue meds.     3. Hyperlipidemia associated with type 2 diabetes mellitus (HCC)  Continue statin.   - CMP in 6 months  - Lipid in 6 months    4. Type 2 diabetes mellitus with other specified  complication, without long-term current use of insulin (HCC)  Continue metformin and jardiance.   - Hemoglobin A1C in 6 months    5. Elevated PSA  Recheck in 1 month. Avoid sexual activity and masturbation for 3-4 days prior to the test.   - PSA - Total [[id number]][Q]    6. Diminished pulses in lower extremity  - US ANKLE BRACHIAL INDEX (CPT=93922); Future      The patient indicates understanding of these issues and agrees to the plan.  Reinforced healthy diet, lifestyle, and exercise.      Return in about 6 months (around 11/20/2025) for med check.     Shirley Nichols MD, 5/18/2025     Supplementary Documentation:   General Health:  In the past six months, have you lost more than 10 pounds without trying?: (Patient-Rptd) 2 - No  Has your appetite been poor?: (Patient-Rptd) No  Type of Diet: (Patient-Rptd) Balanced  How does the patient maintain a good energy level?: (Patient-Rptd) Appropriate Exercise, Other  How would you describe your daily physical activity?: (Patient-Rptd) Moderate  How would you describe your current health state?: (Patient-Rptd) Fair  How do you maintain positive mental well-being?: (Patient-Rptd) Social Interaction  On a scale of 0 to 10, with 0 being no pain and 10 being severe pain, what is your pain level?: 2 - (Mild) (hands. Leg cramps and shoulders.)  In the past six months, have you experienced urine leakage?: (Patient-Rptd) 0-No  At any time do you feel concerned for the safety/well-being of yourself and/or your children, in your home or elsewhere?: (Patient-Rptd) No  Have you had any immunizations at another office such as Influenza, Hepatitis B, Tetanus, or Pneumococcal?: (Patient-Rptd) Yes    Health Maintenance   Topic Date Due    Zoster Vaccines (2 of 3) 09/28/2015    Annual Well Visit  01/01/2025    Annual Depression Screening  01/01/2025    Fall Risk Screening (Annual)  01/01/2025    Diabetes Care Dilated Eye Exam  04/15/2025    Diabetes Care Foot Exam  04/23/2025    Colorectal Cancer  Screening  04/23/2025    COVID-19 Vaccine (7 - 2024-25 season) 06/28/2025    PSA  09/01/2025    Diabetes Care A1C  11/16/2025    Diabetes Care: GFR  05/16/2026    Influenza Vaccine  Completed    Diabetes Care: Microalb/Creat Ratio (Annual)  Completed    Pneumococcal Vaccine: 50+ Years  Completed    Meningococcal B Vaccine  Aged Out            [1]   Patient Active Problem List  Diagnosis    HTN (hypertension)    Hyperlipidemia associated with type 2 diabetes mellitus (HCC)    Type 2 diabetes mellitus with other specified complication (HCC)   [2]   Outpatient Medications Marked as Taking for the 5/20/25 encounter (Office Visit) with Shirley Nichols MD   Medication Sig    ROSUVASTATIN 20 MG Oral Tab TAKE 1 TABLET BY MOUTH EVERY DAY AT NIGHT    LISINOPRIL 10 MG Oral Tab TAKE 1 TABLET BY MOUTH EVERY DAY    empagliflozin (JARDIANCE) 25 MG Oral Tab TAKE 1 TABLET BY MOUTH EVERY DAY    METFORMIN  MG Oral Tablet 24 Hr TAKE 2 TABLETS BY MOUTH TWICE A DAY WITH MEALS (Patient taking differently: Take 2 tablets (1,000 mg total) by mouth in the morning and 2 tablets (1,000 mg total) in the evening. Take with meals. Taking 750mg twice a day.)    ONETOUCH VERIO In Vitro Strip 1 LANCET BY FINGER STICK ROUTE 2 (TWO) TIMES DAILY.**E11.65    aspirin 81 MG Oral Tab Take 1 tablet (81 mg total) by mouth daily.    ibuprofen (MOTRIN) 200 MG Oral Tab Take 2 tablets (400 mg total) by mouth daily. (Patient taking differently: Take 1 tablet (200 mg total) by mouth in the morning.)

## 2025-05-19 LAB
% FREE PSA: 15 % (CALC)
FREE PSA: 1.1 NG/ML
TOTAL PSA: 7.3 NG/ML

## 2025-05-20 ENCOUNTER — OFFICE VISIT (OUTPATIENT)
Dept: INTERNAL MEDICINE CLINIC | Facility: CLINIC | Age: 70
End: 2025-05-20
Payer: COMMERCIAL

## 2025-05-20 VITALS
BODY MASS INDEX: 27.71 KG/M2 | HEART RATE: 84 BPM | WEIGHT: 206.81 LBS | HEIGHT: 72.5 IN | DIASTOLIC BLOOD PRESSURE: 72 MMHG | TEMPERATURE: 97 F | SYSTOLIC BLOOD PRESSURE: 110 MMHG

## 2025-05-20 DIAGNOSIS — Z12.11 ENCOUNTER FOR SCREENING FECAL OCCULT BLOOD TESTING: ICD-10-CM

## 2025-05-20 DIAGNOSIS — R97.20 ELEVATED PSA: ICD-10-CM

## 2025-05-20 DIAGNOSIS — Z00.00 ENCOUNTER FOR ANNUAL HEALTH EXAMINATION: Primary | ICD-10-CM

## 2025-05-20 DIAGNOSIS — E11.69 TYPE 2 DIABETES MELLITUS WITH OTHER SPECIFIED COMPLICATION, WITHOUT LONG-TERM CURRENT USE OF INSULIN (HCC): ICD-10-CM

## 2025-05-20 DIAGNOSIS — E11.69 HYPERLIPIDEMIA ASSOCIATED WITH TYPE 2 DIABETES MELLITUS (HCC): ICD-10-CM

## 2025-05-20 DIAGNOSIS — E78.5 HYPERLIPIDEMIA ASSOCIATED WITH TYPE 2 DIABETES MELLITUS (HCC): ICD-10-CM

## 2025-05-20 DIAGNOSIS — I10 PRIMARY HYPERTENSION: ICD-10-CM

## 2025-05-20 DIAGNOSIS — R09.89 DIMINISHED PULSES IN LOWER EXTREMITY: ICD-10-CM

## 2025-05-20 PROCEDURE — 3061F NEG MICROALBUMINURIA REV: CPT | Performed by: INTERNAL MEDICINE

## 2025-05-20 PROCEDURE — G2211 COMPLEX E/M VISIT ADD ON: HCPCS | Performed by: INTERNAL MEDICINE

## 2025-05-20 PROCEDURE — 1159F MED LIST DOCD IN RCRD: CPT | Performed by: INTERNAL MEDICINE

## 2025-05-20 PROCEDURE — 3078F DIAST BP <80 MM HG: CPT | Performed by: INTERNAL MEDICINE

## 2025-05-20 PROCEDURE — 3051F HG A1C>EQUAL 7.0%<8.0%: CPT | Performed by: INTERNAL MEDICINE

## 2025-05-20 PROCEDURE — 3008F BODY MASS INDEX DOCD: CPT | Performed by: INTERNAL MEDICINE

## 2025-05-20 PROCEDURE — G0439 PPPS, SUBSEQ VISIT: HCPCS | Performed by: INTERNAL MEDICINE

## 2025-05-20 PROCEDURE — 96160 PT-FOCUSED HLTH RISK ASSMT: CPT | Performed by: INTERNAL MEDICINE

## 2025-05-20 PROCEDURE — 1125F AMNT PAIN NOTED PAIN PRSNT: CPT | Performed by: INTERNAL MEDICINE

## 2025-05-20 PROCEDURE — 82272 OCCULT BLD FECES 1-3 TESTS: CPT | Performed by: INTERNAL MEDICINE

## 2025-05-20 PROCEDURE — 1170F FXNL STATUS ASSESSED: CPT | Performed by: INTERNAL MEDICINE

## 2025-05-20 PROCEDURE — 3074F SYST BP LT 130 MM HG: CPT | Performed by: INTERNAL MEDICINE

## 2025-05-20 PROCEDURE — 99214 OFFICE O/P EST MOD 30 MIN: CPT | Performed by: INTERNAL MEDICINE

## 2025-07-21 DIAGNOSIS — E11.65 TYPE 2 DIABETES MELLITUS WITH HYPERGLYCEMIA, WITHOUT LONG-TERM CURRENT USE OF INSULIN (HCC): Chronic | ICD-10-CM

## 2025-07-21 DIAGNOSIS — I10 PRIMARY HYPERTENSION: ICD-10-CM

## 2025-07-21 DIAGNOSIS — E78.00 PURE HYPERCHOLESTEROLEMIA: ICD-10-CM

## 2025-07-22 RX ORDER — ROSUVASTATIN CALCIUM 20 MG/1
20 TABLET, COATED ORAL NIGHTLY
Qty: 90 TABLET | Refills: 0 | Status: SHIPPED | OUTPATIENT
Start: 2025-07-22

## 2025-07-22 RX ORDER — LISINOPRIL 10 MG/1
10 TABLET ORAL DAILY
Qty: 90 TABLET | Refills: 0 | Status: SHIPPED | OUTPATIENT
Start: 2025-07-22

## 2025-07-22 NOTE — TELEPHONE ENCOUNTER
Diabetes Medication Protocol [id number]/21/2025 03:20 PM   Protocol Details Last A1C < 7.5 and within past 6 months    In person appointment or virtual visit in the past 6 mos or appointment in next 3 mos    Microalbumin procedure in past 12 months or taking ACE/ARB    EGFRCR or GFRNAA > 50    GFR in the past 12 months    Medication is active on med list       To be filled at: [facility]/pharmacy #[id number] - [place]   4. Type 2 diabetes mellitus with other specified complication, without long-term current use of insulin (HCC)  Continue metformin and jardiance  Future Appointments   Date Time Provider Department Center   11/18/2025 12:40 PM Shirley Nichols MD [facility]

## 2025-07-24 DIAGNOSIS — E11.65 TYPE 2 DIABETES MELLITUS WITH HYPERGLYCEMIA, WITHOUT LONG-TERM CURRENT USE OF INSULIN (HCC): Chronic | ICD-10-CM

## 2025-07-25 DIAGNOSIS — E11.65 TYPE 2 DIABETES MELLITUS WITH HYPERGLYCEMIA, WITHOUT LONG-TERM CURRENT USE OF INSULIN (HCC): Chronic | ICD-10-CM

## 2025-07-25 RX ORDER — EMPAGLIFLOZIN 25 MG/1
25 TABLET, FILM COATED ORAL DAILY
Qty: 90 TABLET | Refills: 1 | Status: SHIPPED | OUTPATIENT
Start: 2025-07-25

## 2025-07-25 NOTE — TELEPHONE ENCOUNTER
Diabetes Medication Protocol [id number]/24/2025 02:21 AM   Protocol Details Last A1C < 7.5 and within past 6 months    In person appointment or virtual visit in the past 6 mos or appointment in next 3 mos    Microalbumin procedure in past 12 months or taking ACE/ARB    EGFRCR or GFRNAA > 50    GFR in the past 12 months    Medication is active on med list      4. Type 2 diabetes mellitus with other specified complication, without long-term current use of insulin (HCC)  Continue metformin and jardiance.   Future Appointments   Date Time Provider Department Center   11/18/2025 12:40 PM Shirley Nichols MD [facility]

## 2025-07-26 LAB
ALBUMIN/GLOBULIN RATIO: 2 (CALC) (ref 1–2.5)
ALBUMIN: 4.1 G/DL (ref 3.6–5.1)
ALKALINE PHOSPHATASE: 58 U/L (ref 35–144)
ALT: 11 U/L (ref 9–46)
AST: 12 U/L (ref 10–35)
BILIRUBIN, TOTAL: 0.4 MG/DL (ref 0.2–1.2)
BUN: 24 MG/DL (ref 7–25)
CALCIUM: 9 MG/DL (ref 8.6–10.3)
CARBON DIOXIDE: 25 MMOL/L (ref 20–32)
CHLORIDE: 106 MMOL/L (ref 98–110)
CHOL/HDLC RATIO: 3.7 (CALC)
CHOLESTEROL, TOTAL: 144 MG/DL
CREATININE: 1.19 MG/DL (ref 0.7–1.28)
EGFR: 65 ML/MIN/1.73M2
GLOBULIN: 2.1 G/DL (CALC) (ref 1.9–3.7)
GLUCOSE: 131 MG/DL (ref 65–99)
HDL CHOLESTEROL: 39 MG/DL
HEMOGLOBIN A1C: 7.2 %
LDL-CHOLESTEROL: 83 MG/DL (CALC)
NON-HDL CHOLESTEROL: 105 MG/DL (CALC)
POTASSIUM: 4.5 MMOL/L (ref 3.5–5.3)
PROTEIN, TOTAL: 6.2 G/DL (ref 6.1–8.1)
PSA, TOTAL: 5.76 NG/ML
SODIUM: 140 MMOL/L (ref 135–146)
TRIGLYCERIDES: 127 MG/DL

## 2025-07-28 RX ORDER — METFORMIN HYDROCHLORIDE 500 MG/1
1000 TABLET, EXTENDED RELEASE ORAL 2 TIMES DAILY WITH MEALS
Qty: 360 TABLET | Refills: 0 | Status: SHIPPED | OUTPATIENT
Start: 2025-07-28

## 2025-07-28 NOTE — TELEPHONE ENCOUNTER
Diabetes Medication Protocol Riwoqq5207/25/2025 12:26 AM   Protocol Details Last A1C < 7.5 and within past 6 months    In person appointment or virtual visit in the past 6 mos or appointment in next 3 mos    Microalbumin procedure in past 12 months or taking ACE/ARB    EGFRCR or GFRNAA > 50    GFR in the past 12 months    Medication is active on med list       Type 2 diabetes mellitus with other specified complication, without long-term current use of insulin (HCC)  Continue metformin and jardiance.

## (undated) DIAGNOSIS — I10 ESSENTIAL HYPERTENSION: ICD-10-CM

## (undated) DIAGNOSIS — E11.9 TYPE 2 DIABETES MELLITUS WITHOUT COMPLICATION, WITHOUT LONG-TERM CURRENT USE OF INSULIN (HCC): ICD-10-CM

## (undated) NOTE — LETTER
8/6/2020    Dear Dr. Elizabeth Mg would like to refer you Jannet Lau.     Referring Provider: Quin Claude, MD    Fax: [phone]    As soon as the patient is seen please complete the form below and fax to the referring provider without a cover shee

## (undated) NOTE — LETTER
Diabetes Retinal Eye Examination Report    Patient Name: Chai Marquez                                        : 1955    Referring Physician: Shirley Nichols MD  _____________________________________________________________________________  Patient - Please bring this form to your next eye examination appointment.   Give it to your eye care professional to fill out and return via fax to your primary care provider.     Eye Care Professional - Please fill out this form and fax it back to the referring  primary care physician.   _____________________________________________________________________________     Date of Exam: ___/___/___    The patient received a dilated fundus examination with the following results:    ___ No diabetic retinopathy detected  ___ Background retinopathy was detected, but only requires monitoring  ___ Retinopathy requiring further testing and/or treatment was detected. See notes below.    Notes / Commentary / Recommendations:  _________________________________________________________________________________________________________________________________________________________________________________________________________________________________    The patient is to return for follow-up / evaluation in ____ months.    Eye Care Provider (Print): _______________________Signature___________________ Date ___ / ___/___    Clinic/Office name: _______________________Ph:_____________Fax:_____________

## (undated) NOTE — LETTER
07/26/21    Danni Chan  [address] [place] [address]    Dear Tejas Veloz,    Our records indicate that you have outstanding lab work. To complete your Colon Cancer Screening, please follow the instructions in the package provided to you.   To sc

## (undated) NOTE — LETTER
9/6/2023      I would like to refer you Sandra Bess  4/16/1955    Referring Provider: Laila Villareal MD    Fax: [phone]    As soon as the patient is seen please complete the form below and fax to the referring provider without a cover sheet. Exam Date _______________    Best corrected vision compared to last visit:    Unchanged               Better                       Worse                            No previous visit to compare    Retinal changes compared to last visit:    No retinopathy          Unchanged               Worse               Retinopathy needs Tx Laser/Surgery    For patients with cataracts compared to last visit:    Unchanged               Worse           No previous visit to compare        Cataracts need surgery    Other finding and diagnosis________________________________________________    Treatment recommended__________________________________________________    Return Visit_____________________________________________________________    Thank you for your professional help in treating our patient. Ophthalmologist (Print):_________________________________Signature__________________________    Address: _________________________________________________________________    Telephone: _______________________     Please provide Dr. Laila Villareal MD copies of my medical records as requested.     Patient's signature___________________________________Date_________________________

## (undated) NOTE — MR AVS SNAPSHOT
[address] [address] [phone]               Thank you for choosing us for your health care visit with Nadira Du MD.  We are glad to serve you and happy to provid Commonly known as:  GLUCOPHAGE-XR                Where to Get Your Medications      These medications were sent to [place] [address] [address] [facility], [address] [facility], [phone], Veronica Make half your plate fruits and vegetables Highly refined, white starches including white bread, rice and pasta   Eat plenty of protein, keep the fat content low Sugars:  sodas and sports drinks, candies and desserts   Eat plenty of low-fat dairy products

## (undated) NOTE — LETTER
2023    Dear Dr. Rika Hines would like to refer you Sheldon Dyson # [id number]    Referring Provider: Lonny Wilson MD    Fax: [phone]    As soon as the patient is seen please complete the form below and fax to the referring provider without a cover sheet. Exam Date _______________    Best corrected vision compared to last visit:    Unchanged               Better                       Worse                            No previous visit to compare    Retinal changes compared to last visit:    No retinopathy          Unchanged               Worse               Retinopathy needs Tx Laser/Surgery    For patients with cataracts compared to last visit:    Unchanged               Worse           No previous visit to compare        Cataracts need surgery    Other finding and diagnosis________________________________________________    Treatment recommended__________________________________________________    Return Visit_____________________________________________________________    Thank you for your professional help in treating our patient. Ophthalmologist (Print):_________________________________Signature__________________________    Address: _________________________________________________________________    Telephone: _______________________     Please provide Dr. Lonny Wilson MD_____________________________________copies of my medical records as requested.     Patient's signature___________________________________Date_________________________

## (undated) NOTE — LETTER
08/21/17        Royr Escamilla  [address] [facility] [address]    4/16/1955     Dear Braeden Rodriguez,    [address] [facility] records indicate that you have outstanding lab work and or testing that was ordered for you and has not yet been completed:          Hemoglobin

## (undated) NOTE — LETTER
12/14/2018    Dear Dr. Nena Herbert would like to refer you Paulino Guzman.     Referring Provider: Nancy nA MD    Fax: [phone]    As soon as the patient is seen please complete the form below and fax to the referring provider without a cover sh

## (undated) NOTE — LETTER
01/08/20        Rory Escamilla  [address]      Dear Braeden Rodriguez,    [address] [facility] records indicate that you have outstanding lab work and or testing that was ordered for you and has not yet been completed:        CMP      Lipid      Hemog

## (undated) NOTE — LETTER
10/18/17        Prabhjot Bunny  [address] [facility]   [address] [place] [id number]    4/16/1955    Dear Yuri Pineda,    [address] [facility] records indicate that you have outstanding lab work and or testing that was ordered for you and has not yet been completed:          CMP in 3 mon

## (undated) NOTE — LETTER
1/29/2021    Dear Dr. Ayaka Pastor would like to refer you Zeke Garrett  4/16/1955    Referring Provider: Savanna Barrera MD    Fax: [phone]    As soon as the patient is seen please complete the form below and fax to the referring provider without a

## (undated) NOTE — LETTER
1/17/2022    Dear Dr. Luzmaria Santizo would like to refer you Alberto Wood  4/16/1955    Referring Provider: Constance Tena MD    Fax: [phone]    As soon as the patient is seen please complete the form below and fax to the referring provider without a

## (undated) NOTE — LETTER
7/9/2019    Dear Dr. Sj Moran would like to refer you Marino Montanez.     Referring Provider: Karen Amezcua MD    Fax: [phone]    As soon as the patient is seen please complete the form below and fax to the referring provider without a cover shee

## (undated) NOTE — MR AVS SNAPSHOT
[address] [address] [phone]               Thank you for choosing us for your health care visit with Trent Arredondo MD.  We are glad to serve you and happy to provid Take 1 tablet (10 mg total) by mouth once daily. Commonly known as:  PRINIVIL,ZESTRIL           MetFORMIN HCl  MG Tb24   Take 2 tablets (1,000 mg total) by mouth daily with breakfast.   What changed:  See the new instructions.    Commonly known as:

## (undated) NOTE — Clinical Note
1/24/2017    Dear Dr. Kristy Tuttle would like to refer you Allyson Sanchez.     Referring Provider: Dimitrios Cortez MD    Fax: [phone]    As soon as the patient is seen please complete the form below and fax to the referring provider without a cover she